# Patient Record
Sex: FEMALE | Race: WHITE | NOT HISPANIC OR LATINO
[De-identification: names, ages, dates, MRNs, and addresses within clinical notes are randomized per-mention and may not be internally consistent; named-entity substitution may affect disease eponyms.]

---

## 2017-01-31 ENCOUNTER — APPOINTMENT (OUTPATIENT)
Dept: INTERNAL MEDICINE | Facility: CLINIC | Age: 82
End: 2017-01-31

## 2017-02-03 ENCOUNTER — APPOINTMENT (OUTPATIENT)
Dept: INTERNAL MEDICINE | Facility: CLINIC | Age: 82
End: 2017-02-03

## 2017-02-03 VITALS
RESPIRATION RATE: 14 BRPM | WEIGHT: 104 LBS | DIASTOLIC BLOOD PRESSURE: 76 MMHG | HEART RATE: 88 BPM | SYSTOLIC BLOOD PRESSURE: 124 MMHG | HEIGHT: 66 IN | BODY MASS INDEX: 16.71 KG/M2

## 2017-02-03 DIAGNOSIS — G45.3 AMAUROSIS FUGAX: ICD-10-CM

## 2017-02-03 DIAGNOSIS — R21 RASH AND OTHER NONSPECIFIC SKIN ERUPTION: ICD-10-CM

## 2017-02-08 ENCOUNTER — LABORATORY RESULT (OUTPATIENT)
Age: 82
End: 2017-02-08

## 2017-02-08 ENCOUNTER — RX RENEWAL (OUTPATIENT)
Age: 82
End: 2017-02-08

## 2017-02-13 LAB
25(OH)D3 SERPL-MCNC: 13.9 NG/ML
ALBUMIN SERPL ELPH-MCNC: 4.3 G/DL
ALP BLD-CCNC: 54 U/L
ALT SERPL-CCNC: 17 U/L
ANION GAP SERPL CALC-SCNC: 15 MMOL/L
APPEARANCE: CLEAR
AST SERPL-CCNC: 23 U/L
BASOPHILS # BLD AUTO: 0.05 K/UL
BASOPHILS NFR BLD AUTO: 0.8 %
BILIRUB SERPL-MCNC: 1.1 MG/DL
BILIRUBIN URINE: NEGATIVE
BLOOD URINE: ABNORMAL
BUN SERPL-MCNC: 13 MG/DL
CALCIUM SERPL-MCNC: 9.7 MG/DL
CHLORIDE SERPL-SCNC: 98 MMOL/L
CHOLEST SERPL-MCNC: 271 MG/DL
CHOLEST/HDLC SERPL: 3.4 RATIO
CO2 SERPL-SCNC: 28 MMOL/L
COLOR: YELLOW
CREAT SERPL-MCNC: 0.83 MG/DL
CRP SERPL HS-MCNC: 0.2 MG/L
EOSINOPHIL # BLD AUTO: 0.07 K/UL
EOSINOPHIL NFR BLD AUTO: 1.1 %
GLUCOSE BS SERPL-MCNC: 87 MG/DL
GLUCOSE QUALITATIVE U: NORMAL MG/DL
GLUCOSE SERPL-MCNC: 91 MG/DL
HBA1C MFR BLD HPLC: 5.9 %
HCT VFR BLD CALC: 42.9 %
HDLC SERPL-MCNC: 79 MG/DL
HGB BLD-MCNC: 13.6 G/DL
IMM GRANULOCYTES NFR BLD AUTO: 0.3 %
KETONES URINE: NEGATIVE
LDLC SERPL CALC-MCNC: 163 MG/DL
LEUKOCYTE ESTERASE URINE: ABNORMAL
LYMPHOCYTES # BLD AUTO: 1.3 K/UL
LYMPHOCYTES NFR BLD AUTO: 21.1 %
MAN DIFF?: NORMAL
MCHC RBC-ENTMCNC: 30.8 PG
MCHC RBC-ENTMCNC: 31.7 GM/DL
MCV RBC AUTO: 97.1 FL
MONOCYTES # BLD AUTO: 0.51 K/UL
MONOCYTES NFR BLD AUTO: 8.3 %
NEUTROPHILS # BLD AUTO: 4.22 K/UL
NEUTROPHILS NFR BLD AUTO: 68.4 %
NITRITE URINE: NEGATIVE
PH URINE: 7.5
PLATELET # BLD AUTO: 232 K/UL
POTASSIUM SERPL-SCNC: 4.6 MMOL/L
PROT SERPL-MCNC: 7.2 G/DL
PROTEIN URINE: NEGATIVE MG/DL
RBC # BLD: 4.42 M/UL
RBC # FLD: 14.7 %
SODIUM SERPL-SCNC: 141 MMOL/L
SPECIFIC GRAVITY URINE: 1.01
T4 FREE SERPL-MCNC: 1.4 NG/DL
TRIGL SERPL-MCNC: 147 MG/DL
TSH SERPL-ACNC: 2.86 UIU/ML
UROBILINOGEN URINE: NORMAL MG/DL
VIT B12 SERPL-MCNC: 320 PG/ML
WBC # FLD AUTO: 6.17 K/UL

## 2017-02-28 ENCOUNTER — APPOINTMENT (OUTPATIENT)
Dept: CARDIOLOGY | Facility: CLINIC | Age: 82
End: 2017-02-28

## 2017-03-03 ENCOUNTER — EMERGENCY (EMERGENCY)
Facility: HOSPITAL | Age: 82
LOS: 0 days | Discharge: HOME | End: 2017-03-03
Attending: EMERGENCY MEDICINE | Admitting: EMERGENCY MEDICINE
Payer: MEDICARE

## 2017-03-03 VITALS
OXYGEN SATURATION: 99 % | SYSTOLIC BLOOD PRESSURE: 160 MMHG | HEART RATE: 82 BPM | TEMPERATURE: 97 F | RESPIRATION RATE: 16 BRPM

## 2017-03-03 VITALS
WEIGHT: 125.22 LBS | HEART RATE: 100 BPM | SYSTOLIC BLOOD PRESSURE: 179 MMHG | HEIGHT: 63 IN | RESPIRATION RATE: 19 BRPM | OXYGEN SATURATION: 99 % | DIASTOLIC BLOOD PRESSURE: 111 MMHG | TEMPERATURE: 98 F

## 2017-03-03 DIAGNOSIS — N39.0 URINARY TRACT INFECTION, SITE NOT SPECIFIED: ICD-10-CM

## 2017-03-03 DIAGNOSIS — R42 DIZZINESS AND GIDDINESS: ICD-10-CM

## 2017-03-03 DIAGNOSIS — Z96.642 PRESENCE OF LEFT ARTIFICIAL HIP JOINT: Chronic | ICD-10-CM

## 2017-03-03 DIAGNOSIS — I48.92 UNSPECIFIED ATRIAL FLUTTER: ICD-10-CM

## 2017-03-03 DIAGNOSIS — Z79.82 LONG TERM (CURRENT) USE OF ASPIRIN: ICD-10-CM

## 2017-03-03 DIAGNOSIS — Z99.3 DEPENDENCE ON WHEELCHAIR: ICD-10-CM

## 2017-03-03 DIAGNOSIS — E03.9 HYPOTHYROIDISM, UNSPECIFIED: ICD-10-CM

## 2017-03-03 DIAGNOSIS — Z98.41 CATARACT EXTRACTION STATUS, RIGHT EYE: Chronic | ICD-10-CM

## 2017-03-03 LAB
ALBUMIN SERPL ELPH-MCNC: 3.8 G/DL — SIGNIFICANT CHANGE UP (ref 3.3–5)
ALP SERPL-CCNC: 56 U/L — SIGNIFICANT CHANGE UP (ref 40–120)
ALT FLD-CCNC: 19 U/L — SIGNIFICANT CHANGE UP (ref 12–78)
ANION GAP SERPL CALC-SCNC: 6 MMOL/L — SIGNIFICANT CHANGE UP (ref 5–17)
APPEARANCE UR: CLEAR — SIGNIFICANT CHANGE UP
AST SERPL-CCNC: 39 U/L — HIGH (ref 15–37)
BACTERIA # UR AUTO: (no result)
BASOPHILS # BLD AUTO: 0.1 K/UL — SIGNIFICANT CHANGE UP (ref 0–0.2)
BASOPHILS NFR BLD AUTO: 1.4 % — SIGNIFICANT CHANGE UP (ref 0–2)
BILIRUB SERPL-MCNC: 1.1 MG/DL — SIGNIFICANT CHANGE UP (ref 0.2–1.2)
BILIRUB UR-MCNC: NEGATIVE — SIGNIFICANT CHANGE UP
BUN SERPL-MCNC: 16 MG/DL — SIGNIFICANT CHANGE UP (ref 7–23)
CALCIUM SERPL-MCNC: 9.1 MG/DL — SIGNIFICANT CHANGE UP (ref 8.5–10.1)
CHLORIDE SERPL-SCNC: 102 MMOL/L — SIGNIFICANT CHANGE UP (ref 96–108)
CO2 SERPL-SCNC: 31 MMOL/L — SIGNIFICANT CHANGE UP (ref 22–31)
COLOR SPEC: YELLOW — SIGNIFICANT CHANGE UP
COMMENT - URINE: SIGNIFICANT CHANGE UP
CREAT SERPL-MCNC: 0.78 MG/DL — SIGNIFICANT CHANGE UP (ref 0.5–1.3)
DIFF PNL FLD: (no result)
EOSINOPHIL # BLD AUTO: 0.1 K/UL — SIGNIFICANT CHANGE UP (ref 0–0.5)
EOSINOPHIL NFR BLD AUTO: 1.5 % — SIGNIFICANT CHANGE UP (ref 0–6)
EPI CELLS # UR: SIGNIFICANT CHANGE UP
GLUCOSE SERPL-MCNC: 85 MG/DL — SIGNIFICANT CHANGE UP (ref 70–99)
GLUCOSE UR QL: NEGATIVE MG/DL — SIGNIFICANT CHANGE UP
HCT VFR BLD CALC: 42 % — SIGNIFICANT CHANGE UP (ref 34.5–45)
HGB BLD-MCNC: 14.3 G/DL — SIGNIFICANT CHANGE UP (ref 11.5–15.5)
KETONES UR-MCNC: NEGATIVE — SIGNIFICANT CHANGE UP
LEUKOCYTE ESTERASE UR-ACNC: (no result)
LYMPHOCYTES # BLD AUTO: 1.4 K/UL — SIGNIFICANT CHANGE UP (ref 1–3.3)
LYMPHOCYTES # BLD AUTO: 22 % — SIGNIFICANT CHANGE UP (ref 13–44)
MCHC RBC-ENTMCNC: 32.5 PG — SIGNIFICANT CHANGE UP (ref 27–34)
MCHC RBC-ENTMCNC: 34.1 GM/DL — SIGNIFICANT CHANGE UP (ref 32–36)
MCV RBC AUTO: 95.4 FL — SIGNIFICANT CHANGE UP (ref 80–100)
MONOCYTES # BLD AUTO: 0.5 K/UL — SIGNIFICANT CHANGE UP (ref 0–0.9)
MONOCYTES NFR BLD AUTO: 8.2 % — SIGNIFICANT CHANGE UP (ref 2–14)
NEUTROPHILS # BLD AUTO: 4.2 K/UL — SIGNIFICANT CHANGE UP (ref 1.8–7.4)
NEUTROPHILS NFR BLD AUTO: 67 % — SIGNIFICANT CHANGE UP (ref 43–77)
NITRITE UR-MCNC: NEGATIVE — SIGNIFICANT CHANGE UP
PH UR: 8 — SIGNIFICANT CHANGE UP (ref 4.8–8)
PLATELET # BLD AUTO: 196 K/UL — SIGNIFICANT CHANGE UP (ref 150–400)
POTASSIUM SERPL-MCNC: 5.1 MMOL/L — SIGNIFICANT CHANGE UP (ref 3.5–5.3)
POTASSIUM SERPL-SCNC: 5.1 MMOL/L — SIGNIFICANT CHANGE UP (ref 3.5–5.3)
PROT SERPL-MCNC: 7.2 GM/DL — SIGNIFICANT CHANGE UP (ref 6–8.3)
PROT UR-MCNC: 15 MG/DL
RBC # BLD: 4.41 M/UL — SIGNIFICANT CHANGE UP (ref 3.8–5.2)
RBC # FLD: 13.2 % — SIGNIFICANT CHANGE UP (ref 10.3–14.5)
RBC CASTS # UR COMP ASSIST: (no result) /HPF (ref 0–4)
SODIUM SERPL-SCNC: 139 MMOL/L — SIGNIFICANT CHANGE UP (ref 135–145)
SP GR SPEC: 1.01 — SIGNIFICANT CHANGE UP (ref 1.01–1.02)
UROBILINOGEN FLD QL: NEGATIVE MG/DL — SIGNIFICANT CHANGE UP
WBC # BLD: 6.3 K/UL — SIGNIFICANT CHANGE UP (ref 3.8–10.5)
WBC # FLD AUTO: 6.3 K/UL — SIGNIFICANT CHANGE UP (ref 3.8–10.5)
WBC UR QL: (no result)

## 2017-03-03 PROCEDURE — 70450 CT HEAD/BRAIN W/O DYE: CPT | Mod: 26

## 2017-03-03 PROCEDURE — 99284 EMERGENCY DEPT VISIT MOD MDM: CPT

## 2017-03-03 PROCEDURE — 93010 ELECTROCARDIOGRAM REPORT: CPT

## 2017-03-03 PROCEDURE — 71010: CPT | Mod: 26

## 2017-03-03 RX ORDER — CEFTRIAXONE 500 MG/1
1 INJECTION, POWDER, FOR SOLUTION INTRAMUSCULAR; INTRAVENOUS ONCE
Qty: 0 | Refills: 0 | Status: COMPLETED | OUTPATIENT
Start: 2017-03-03 | End: 2017-03-03

## 2017-03-03 RX ORDER — CEPHALEXIN 500 MG
1 CAPSULE ORAL
Qty: 40 | Refills: 0 | OUTPATIENT
Start: 2017-03-03 | End: 2017-03-13

## 2017-03-03 RX ORDER — SODIUM CHLORIDE 9 MG/ML
1000 INJECTION INTRAMUSCULAR; INTRAVENOUS; SUBCUTANEOUS ONCE
Qty: 0 | Refills: 0 | Status: COMPLETED | OUTPATIENT
Start: 2017-03-03 | End: 2017-03-03

## 2017-03-03 RX ORDER — POLYETHYLENE GLYCOL 3350 17 G/17G
17 POWDER, FOR SOLUTION ORAL ONCE
Qty: 0 | Refills: 0 | Status: COMPLETED | OUTPATIENT
Start: 2017-03-03 | End: 2017-03-03

## 2017-03-03 RX ADMIN — CEFTRIAXONE 100 GRAM(S): 500 INJECTION, POWDER, FOR SOLUTION INTRAMUSCULAR; INTRAVENOUS at 18:05

## 2017-03-03 RX ADMIN — SODIUM CHLORIDE 1000 MILLILITER(S): 9 INJECTION INTRAMUSCULAR; INTRAVENOUS; SUBCUTANEOUS at 16:09

## 2017-03-03 RX ADMIN — POLYETHYLENE GLYCOL 3350 17 GRAM(S): 17 POWDER, FOR SOLUTION ORAL at 17:25

## 2017-03-03 NOTE — ED PROVIDER NOTE - NEUROLOGICAL, MLM
Alert and oriented, no focal deficits, no motor or sensory deficits. Positive finger to nose. Positive SLR.

## 2017-03-03 NOTE — ED PROVIDER NOTE - DETAILS:
I, Prateek Madrid, performed the initial face to face bedside interview with this patient regarding history of present illness, review of symptoms and relevant past medical, social and family history.  I completed an independent physical examination.  I was the initial provider who evaluated this patient.  The history, relevant review of systems, past medical and surgical history, medical decision making, and physical examination was documented by the scribe in my presence and I attest to the accuracy of the documentation.

## 2017-03-03 NOTE — ED PROVIDER NOTE - PMH
Atrial fibrillation, chronic    Carpal tunnel syndrome, right    Hypertension    Hypothyroid    Other iron deficiency anemia

## 2017-03-03 NOTE — ED PROVIDER NOTE - CONSTITUTIONAL, MLM
normal... Well appearing, elderly female, awake, alert, oriented to person, place, time/situation and in no apparent distress.

## 2017-03-03 NOTE — ED PROVIDER NOTE - NS ED MD SCRIBE ATTENDING SCRIBE SECTIONS
PROGRESS NOTE/RESULTS/PAST MEDICAL/SURGICAL/SOCIAL HISTORY/HISTORY OF PRESENT ILLNESS/DISPOSITION/REVIEW OF SYSTEMS/PHYSICAL EXAM

## 2017-03-03 NOTE — ED PROVIDER NOTE - OBJECTIVE STATEMENT
89 y/o F with a PMHx of AFIB on ASA, HTN, x2 blood transfusions last year presents to the ED c/o vertigo like symptoms including dizziness, spinning sensation, lightheadedness that started this past Sunday. Pt daughter at bedside giving pt hx and states that she has been worsening over the past year. Pt daughter states that she is now wheelchair bound and unable to walk adequately. Pt states that the symptoms are localized to the left side, currently calm and denies any other acute c/o at this time.

## 2017-03-06 ENCOUNTER — APPOINTMENT (OUTPATIENT)
Dept: CARDIOLOGY | Facility: CLINIC | Age: 82
End: 2017-03-06

## 2017-03-06 ENCOUNTER — NON-APPOINTMENT (OUTPATIENT)
Age: 82
End: 2017-03-06

## 2017-03-06 VITALS
BODY MASS INDEX: 16.71 KG/M2 | OXYGEN SATURATION: 97 % | HEIGHT: 66 IN | HEART RATE: 80 BPM | WEIGHT: 104 LBS | RESPIRATION RATE: 17 BRPM | SYSTOLIC BLOOD PRESSURE: 144 MMHG | DIASTOLIC BLOOD PRESSURE: 76 MMHG

## 2017-03-19 ENCOUNTER — RX RENEWAL (OUTPATIENT)
Age: 82
End: 2017-03-19

## 2017-04-20 ENCOUNTER — RX RENEWAL (OUTPATIENT)
Age: 82
End: 2017-04-20

## 2017-05-05 ENCOUNTER — APPOINTMENT (OUTPATIENT)
Dept: INTERNAL MEDICINE | Facility: CLINIC | Age: 82
End: 2017-05-05

## 2017-05-15 ENCOUNTER — APPOINTMENT (OUTPATIENT)
Dept: CARDIOLOGY | Facility: CLINIC | Age: 82
End: 2017-05-15

## 2017-05-16 ENCOUNTER — RX RENEWAL (OUTPATIENT)
Age: 82
End: 2017-05-16

## 2017-07-14 ENCOUNTER — RX RENEWAL (OUTPATIENT)
Age: 82
End: 2017-07-14

## 2017-08-09 ENCOUNTER — RX RENEWAL (OUTPATIENT)
Age: 82
End: 2017-08-09

## 2017-08-15 ENCOUNTER — APPOINTMENT (OUTPATIENT)
Dept: INTERNAL MEDICINE | Facility: CLINIC | Age: 82
End: 2017-08-15
Payer: MEDICARE

## 2017-08-15 VITALS
SYSTOLIC BLOOD PRESSURE: 142 MMHG | BODY MASS INDEX: 17.04 KG/M2 | HEART RATE: 74 BPM | HEIGHT: 66 IN | WEIGHT: 106 LBS | DIASTOLIC BLOOD PRESSURE: 80 MMHG | RESPIRATION RATE: 15 BRPM

## 2017-08-15 DIAGNOSIS — R60.9 EDEMA, UNSPECIFIED: ICD-10-CM

## 2017-08-15 PROCEDURE — 99215 OFFICE O/P EST HI 40 MIN: CPT

## 2017-09-08 ENCOUNTER — RX RENEWAL (OUTPATIENT)
Age: 82
End: 2017-09-08

## 2017-10-16 ENCOUNTER — RX RENEWAL (OUTPATIENT)
Age: 82
End: 2017-10-16

## 2017-11-01 ENCOUNTER — RX RENEWAL (OUTPATIENT)
Age: 82
End: 2017-11-01

## 2017-11-29 ENCOUNTER — APPOINTMENT (OUTPATIENT)
Dept: CARDIOLOGY | Facility: CLINIC | Age: 82
End: 2017-11-29
Payer: MEDICARE

## 2017-11-29 ENCOUNTER — NON-APPOINTMENT (OUTPATIENT)
Age: 82
End: 2017-11-29

## 2017-11-29 VITALS
BODY MASS INDEX: 16.39 KG/M2 | OXYGEN SATURATION: 91 % | HEIGHT: 66 IN | RESPIRATION RATE: 17 BRPM | DIASTOLIC BLOOD PRESSURE: 66 MMHG | WEIGHT: 102 LBS | HEART RATE: 85 BPM | SYSTOLIC BLOOD PRESSURE: 136 MMHG

## 2017-11-29 VITALS — SYSTOLIC BLOOD PRESSURE: 150 MMHG | DIASTOLIC BLOOD PRESSURE: 70 MMHG

## 2017-11-29 PROCEDURE — 93000 ELECTROCARDIOGRAM COMPLETE: CPT

## 2017-11-29 PROCEDURE — 99214 OFFICE O/P EST MOD 30 MIN: CPT

## 2018-01-11 ENCOUNTER — RX RENEWAL (OUTPATIENT)
Age: 83
End: 2018-01-11

## 2018-02-02 ENCOUNTER — MEDICATION RENEWAL (OUTPATIENT)
Age: 83
End: 2018-02-02

## 2018-04-08 ENCOUNTER — EMERGENCY (EMERGENCY)
Facility: HOSPITAL | Age: 83
LOS: 1 days | Discharge: HOME | End: 2018-04-08
Admitting: EMERGENCY MEDICINE
Payer: MEDICARE

## 2018-04-08 DIAGNOSIS — Z96.642 PRESENCE OF LEFT ARTIFICIAL HIP JOINT: Chronic | ICD-10-CM

## 2018-04-08 DIAGNOSIS — E78.00 PURE HYPERCHOLESTEROLEMIA, UNSPECIFIED: ICD-10-CM

## 2018-04-08 DIAGNOSIS — M25.512 PAIN IN LEFT SHOULDER: ICD-10-CM

## 2018-04-08 DIAGNOSIS — Z98.41 CATARACT EXTRACTION STATUS, RIGHT EYE: Chronic | ICD-10-CM

## 2018-04-08 DIAGNOSIS — I10 ESSENTIAL (PRIMARY) HYPERTENSION: ICD-10-CM

## 2018-04-08 DIAGNOSIS — Z87.39 PERSONAL HISTORY OF OTHER DISEASES OF THE MUSCULOSKELETAL SYSTEM AND CONNECTIVE TISSUE: ICD-10-CM

## 2018-04-08 DIAGNOSIS — E03.9 HYPOTHYROIDISM, UNSPECIFIED: ICD-10-CM

## 2018-04-08 DIAGNOSIS — M79.622 PAIN IN LEFT UPPER ARM: ICD-10-CM

## 2018-04-08 DIAGNOSIS — Z79.82 LONG TERM (CURRENT) USE OF ASPIRIN: ICD-10-CM

## 2018-04-08 DIAGNOSIS — Z79.899 OTHER LONG TERM (CURRENT) DRUG THERAPY: ICD-10-CM

## 2018-04-08 PROCEDURE — 99284 EMERGENCY DEPT VISIT MOD MDM: CPT

## 2018-04-08 PROCEDURE — 71101 X-RAY EXAM UNILAT RIBS/CHEST: CPT | Mod: 26

## 2018-04-08 PROCEDURE — 73030 X-RAY EXAM OF SHOULDER: CPT | Mod: 26,LT

## 2018-04-08 PROCEDURE — 73060 X-RAY EXAM OF HUMERUS: CPT | Mod: 26,LT

## 2018-04-09 PROCEDURE — 96372 THER/PROPH/DIAG INJ SC/IM: CPT

## 2018-04-09 PROCEDURE — 99284 EMERGENCY DEPT VISIT MOD MDM: CPT | Mod: 25

## 2018-04-09 PROCEDURE — 73060 X-RAY EXAM OF HUMERUS: CPT

## 2018-04-09 PROCEDURE — 71101 X-RAY EXAM UNILAT RIBS/CHEST: CPT

## 2018-04-09 PROCEDURE — 73030 X-RAY EXAM OF SHOULDER: CPT

## 2018-04-12 ENCOUNTER — APPOINTMENT (OUTPATIENT)
Dept: CT IMAGING | Facility: CLINIC | Age: 83
End: 2018-04-12

## 2018-04-12 ENCOUNTER — OUTPATIENT (OUTPATIENT)
Dept: OUTPATIENT SERVICES | Facility: HOSPITAL | Age: 83
LOS: 1 days | End: 2018-04-12
Payer: MEDICARE

## 2018-04-12 DIAGNOSIS — Z98.41 CATARACT EXTRACTION STATUS, RIGHT EYE: Chronic | ICD-10-CM

## 2018-04-12 DIAGNOSIS — Z96.642 PRESENCE OF LEFT ARTIFICIAL HIP JOINT: Chronic | ICD-10-CM

## 2018-04-12 DIAGNOSIS — Z00.8 ENCOUNTER FOR OTHER GENERAL EXAMINATION: ICD-10-CM

## 2018-04-12 PROCEDURE — 71250 CT THORAX DX C-: CPT

## 2018-04-12 PROCEDURE — 71250 CT THORAX DX C-: CPT | Mod: 26

## 2018-04-15 ENCOUNTER — RX RENEWAL (OUTPATIENT)
Age: 83
End: 2018-04-15

## 2018-04-23 ENCOUNTER — NON-APPOINTMENT (OUTPATIENT)
Age: 83
End: 2018-04-23

## 2018-04-23 ENCOUNTER — APPOINTMENT (OUTPATIENT)
Dept: CARDIOLOGY | Facility: CLINIC | Age: 83
End: 2018-04-23
Payer: MEDICARE

## 2018-04-23 VITALS
SYSTOLIC BLOOD PRESSURE: 146 MMHG | RESPIRATION RATE: 16 BRPM | DIASTOLIC BLOOD PRESSURE: 56 MMHG | HEIGHT: 66 IN | OXYGEN SATURATION: 96 % | HEART RATE: 74 BPM

## 2018-04-23 PROCEDURE — 99215 OFFICE O/P EST HI 40 MIN: CPT

## 2018-04-23 PROCEDURE — 93000 ELECTROCARDIOGRAM COMPLETE: CPT

## 2018-05-28 ENCOUNTER — EMERGENCY (EMERGENCY)
Facility: HOSPITAL | Age: 83
LOS: 1 days | Discharge: HOME | End: 2018-05-28
Admitting: EMERGENCY MEDICINE
Payer: MEDICARE

## 2018-05-28 DIAGNOSIS — Z96.642 PRESENCE OF LEFT ARTIFICIAL HIP JOINT: Chronic | ICD-10-CM

## 2018-05-28 DIAGNOSIS — Z98.41 CATARACT EXTRACTION STATUS, RIGHT EYE: Chronic | ICD-10-CM

## 2018-05-28 PROCEDURE — 87086 URINE CULTURE/COLONY COUNT: CPT

## 2018-05-28 PROCEDURE — 99284 EMERGENCY DEPT VISIT MOD MDM: CPT

## 2018-05-28 PROCEDURE — 70450 CT HEAD/BRAIN W/O DYE: CPT | Mod: 26

## 2018-05-28 PROCEDURE — 99284 EMERGENCY DEPT VISIT MOD MDM: CPT | Mod: 25

## 2018-05-28 PROCEDURE — 81002 URINALYSIS NONAUTO W/O SCOPE: CPT

## 2018-05-28 PROCEDURE — 36415 COLL VENOUS BLD VENIPUNCTURE: CPT

## 2018-05-28 PROCEDURE — 70450 CT HEAD/BRAIN W/O DYE: CPT

## 2018-05-28 PROCEDURE — 80048 BASIC METABOLIC PNL TOTAL CA: CPT

## 2018-05-28 PROCEDURE — 85027 COMPLETE CBC AUTOMATED: CPT

## 2018-06-06 ENCOUNTER — LABORATORY RESULT (OUTPATIENT)
Age: 83
End: 2018-06-06

## 2018-06-06 ENCOUNTER — APPOINTMENT (OUTPATIENT)
Dept: CARDIOLOGY | Facility: CLINIC | Age: 83
End: 2018-06-06

## 2018-06-06 ENCOUNTER — APPOINTMENT (OUTPATIENT)
Dept: CARDIOLOGY | Facility: CLINIC | Age: 83
End: 2018-06-06
Payer: MEDICARE

## 2018-06-06 ENCOUNTER — APPOINTMENT (OUTPATIENT)
Dept: INTERNAL MEDICINE | Facility: CLINIC | Age: 83
End: 2018-06-06
Payer: MEDICARE

## 2018-06-06 ENCOUNTER — NON-APPOINTMENT (OUTPATIENT)
Age: 83
End: 2018-06-06

## 2018-06-06 VITALS
SYSTOLIC BLOOD PRESSURE: 142 MMHG | RESPIRATION RATE: 15 BRPM | HEART RATE: 88 BPM | OXYGEN SATURATION: 95 % | HEIGHT: 66 IN | DIASTOLIC BLOOD PRESSURE: 70 MMHG

## 2018-06-06 VITALS
BODY MASS INDEX: 15.91 KG/M2 | RESPIRATION RATE: 15 BRPM | WEIGHT: 99 LBS | DIASTOLIC BLOOD PRESSURE: 78 MMHG | HEART RATE: 88 BPM | SYSTOLIC BLOOD PRESSURE: 132 MMHG | HEIGHT: 66 IN

## 2018-06-06 DIAGNOSIS — R25.1 TREMOR, UNSPECIFIED: ICD-10-CM

## 2018-06-06 DIAGNOSIS — R27.0 ATAXIA, UNSPECIFIED: ICD-10-CM

## 2018-06-06 DIAGNOSIS — I34.0 NONRHEUMATIC MITRAL (VALVE) INSUFFICIENCY: ICD-10-CM

## 2018-06-06 DIAGNOSIS — I35.1 NONRHEUMATIC AORTIC (VALVE) INSUFFICIENCY: ICD-10-CM

## 2018-06-06 PROCEDURE — 93306 TTE W/DOPPLER COMPLETE: CPT

## 2018-06-06 PROCEDURE — 90670 PCV13 VACCINE IM: CPT

## 2018-06-06 PROCEDURE — 99214 OFFICE O/P EST MOD 30 MIN: CPT

## 2018-06-06 PROCEDURE — 99215 OFFICE O/P EST HI 40 MIN: CPT | Mod: 25

## 2018-06-06 PROCEDURE — G0009: CPT

## 2018-06-06 PROCEDURE — 93000 ELECTROCARDIOGRAM COMPLETE: CPT

## 2018-06-06 NOTE — REVIEW OF SYSTEMS
[Fatigue] : fatigue [Hearing Loss] : hearing loss [Nasal Discharge] : nasal discharge [Postnasal Drip] : postnasal drip [Constipation] : constipation [Nocturia] : nocturia [Poor Libido] : poor libido [Joint Stiffness] : joint stiffness [Muscle Weakness] : muscle weakness [Back Pain] : back pain [Insomnia] : insomnia [Anxiety] : anxiety [Depression] : depression [Negative] : Heme/Lymph [Fever] : no fever [Chills] : no chills [Hot Flashes] : no hot flashes [Night Sweats] : no night sweats [Recent Change In Weight] : ~T no recent weight change [Discharge] : no discharge [Pain] : no pain [Redness] : no redness [Dryness] : no dryness  [Vision Problems] : no vision problems [Itching] : no itching [Earache] : no earache [Hoarseness] : no hoarseness [Sore Throat] : no sore throat [Chest Pain] : no chest pain [Palpitations] : no palpitations [Leg Claudication] : no leg claudication [Lower Ext Edema] : no lower extremity edema [Orthopnea] : no orthopnea [Paroysmal Nocturnal Dyspnea] : no paroysmal nocturnal dyspnea [Shortness Of Breath] : no shortness of breath [Wheezing] : no wheezing [Cough] : no cough [Dyspnea on Exertion] : no dyspnea on exertion [Abdominal Pain] : no abdominal pain [Nausea] : no nausea [Diarrhea] : diarrhea [Vomiting] : no vomiting [Heartburn] : no heartburn [Melena] : no melena [Dysuria] : no dysuria [Incontinence] : no incontinence [Hematuria] : no hematuria [Frequency] : no frequency [Vaginal Discharge] : no vaginal discharge [Dysmenorrhea] : no dysmenorrhea [Joint Pain] : no joint pain [Joint Swelling] : no joint swelling [Muscle Pain] : no muscle pain [Itching] : no itching [Mole Changes] : no mole changes [Nail Changes] : no nail changes [Hair Changes] : no hair changes [Skin Rash] : no skin rash [Headache] : no headache [Dizziness] : no dizziness [Fainting] : no fainting [Confusion] : no confusion [Memory Loss] : no memory loss [Unsteady Walking] : no ataxia [Suicidal] : not suicidal [Easy Bleeding] : no easy bleeding [Easy Bruising] : no easy bruising [Swollen Glands] : no swollen glands

## 2018-06-06 NOTE — HISTORY OF PRESENT ILLNESS
[FreeTextEntry1] : Comes to the office for followup accompanied by her daughter and aide to review her medications and discuss her overall health she is mostly concerned about postnasal drip that she has been having [de-identified] : 89-year-old woman comes to the office by wheelchair for followup with a history of ataxia constipation hyperlipidemia hypertension hypothyroidism mitral regurgitation anemia and atrial fibrillation he is in his wheelchair bound second to an unsteady gait and muscle weakness she has been eating poorly and she has lost several pounds since last seen she denies abdominal pain nausea or vomiting she has no diarrhea rectal bleeding she is constipated denies chest pain shortness of breath wheezing coughing palpitations lightheadedness vertigo dizziness or syncope he has had no temperature chills sweats or myalgias is no dysuria she does get up 3 or 4 times at night to urinate

## 2018-06-06 NOTE — PHYSICAL EXAM
[No Acute Distress] : no acute distress [Well Nourished] : well nourished [Well Developed] : well developed [Well-Appearing] : well-appearing [Normal Voice/Communication] : normal voice/communication [Normal Sclera/Conjunctiva] : normal sclera/conjunctiva [PERRL] : pupils equal round and reactive to light [EOMI] : extraocular movements intact [Normal Outer Ear/Nose] : the outer ears and nose were normal in appearance [Normal Oropharynx] : the oropharynx was normal [Normal TMs] : both tympanic membranes were normal [Normal Nasal Mucosa] : the nasal mucosa was normal [No JVD] : no jugular venous distention [Supple] : supple [No Lymphadenopathy] : no lymphadenopathy [Thyroid Normal, No Nodules] : the thyroid was normal and there were no nodules present [No Respiratory Distress] : no respiratory distress  [Clear to Auscultation] : lungs were clear to auscultation bilaterally [No Accessory Muscle Use] : no accessory muscle use [Normal Percussion] : the chest was normal to percussion [Normal Rate] : normal rate  [Regular Rhythm] : with a regular rhythm [Normal S1, S2] : normal S1 and S2 [No Murmur] : no murmur heard [No Carotid Bruits] : no carotid bruits [No Abdominal Bruit] : a ~M bruit was not heard ~T in the abdomen [No Varicosities] : no varicosities [Pedal Pulses Present] : the pedal pulses are present [No Edema] : there was no peripheral edema [No Extremity Clubbing/Cyanosis] : no extremity clubbing/cyanosis [No Palpable Aorta] : no palpable aorta [Declined Breast Exam] : declined breast exam  [Soft] : abdomen soft [Non Tender] : non-tender [Non-distended] : non-distended [No Masses] : no abdominal mass palpated [No HSM] : no HSM [Normal Bowel Sounds] : normal bowel sounds [No Hernias] : no hernias [Normal Supraclavicular Nodes] : no supraclavicular lymphadenopathy [Normal Axillary Nodes] : no axillary lymphadenopathy [Normal Posterior Cervical Nodes] : no posterior cervical lymphadenopathy [Normal Femoral Nodes] : no femoral lymphadenopathy [Normal Anterior Cervical Nodes] : no anterior cervical lymphadenopathy [Normal Inguinal Nodes] : no inguinal lymphadenopathy [No CVA Tenderness] : no CVA  tenderness [No Spinal Tenderness] : no spinal tenderness [No Joint Swelling] : no joint swelling [Grossly Normal Strength/Tone] : grossly normal strength/tone [No Rash] : no rash [Normal Gait] : normal gait [Coordination Grossly Intact] : coordination grossly intact [No Focal Deficits] : no focal deficits [Deep Tendon Reflexes (DTR)] : deep tendon reflexes were 2+ and symmetric [Speech Grossly Normal] : speech grossly normal [Memory Grossly Normal] : memory grossly normal [Normal Affect] : the affect was normal [Alert and Oriented x3] : oriented to person, place, and time [Normal Mood] : the mood was normal [Normal Insight/Judgement] : insight and judgment were intact [Kyphosis] : no kyphosis [Scoliosis] : no scoliosis

## 2018-06-06 NOTE — ASSESSMENT
[FreeTextEntry1] : Physical exam shows an elderly woman in no acute distress blood pressure was 132/78 her height was 5 foot 6 weight approximately 99 pounds pulse is 88.. Respiratory rate was 15 HEENT was unremarkable chest was clear cardiovascular exam was irregular with a 2/6 systolic murmur abdomen soft and nontender extremities show no clubbing cyanosis or edema neurologic exam was nonfocal........... tremor neurologic origin referred to Dr. James neurologist for opinion as per her bowels she was told to increase the fiber in her diet and liquids he often frequently rather than having to worry about he does a lot of meals she was advised to use MiraLax and/or fiber in the morning every day and she is milk of magnesia or a Fleet enema if she is down to limit her antibiotics as unsure if clear urinary tract infection secondary to no urine analysis did show leukocytes. We'll repeat a urine culture and urinalysis next week

## 2018-06-07 ENCOUNTER — LABORATORY RESULT (OUTPATIENT)
Age: 83
End: 2018-06-07

## 2018-06-19 DIAGNOSIS — Z00.00 ENCOUNTER FOR GENERAL ADULT MEDICAL EXAMINATION W/OUT ABNORMAL FINDINGS: ICD-10-CM

## 2018-06-19 LAB
APPEARANCE: CLEAR
BILIRUBIN URINE: NEGATIVE
BLOOD URINE: NEGATIVE
COLOR: YELLOW
GLUCOSE QUALITATIVE U: NEGATIVE MG/DL
KETONES URINE: NEGATIVE
LEUKOCYTE ESTERASE URINE: ABNORMAL
NITRITE URINE: NEGATIVE
PH URINE: 7.5
PROTEIN URINE: ABNORMAL MG/DL
SPECIFIC GRAVITY URINE: 1.02
UROBILINOGEN URINE: NEGATIVE MG/DL

## 2018-06-20 ENCOUNTER — EMERGENCY (EMERGENCY)
Facility: HOSPITAL | Age: 83
LOS: 0 days | Discharge: HOME | End: 2018-06-20
Attending: EMERGENCY MEDICINE | Admitting: EMERGENCY MEDICINE
Payer: MEDICARE

## 2018-06-20 VITALS
SYSTOLIC BLOOD PRESSURE: 156 MMHG | DIASTOLIC BLOOD PRESSURE: 99 MMHG | RESPIRATION RATE: 18 BRPM | HEART RATE: 106 BPM | OXYGEN SATURATION: 100 %

## 2018-06-20 VITALS
OXYGEN SATURATION: 100 % | HEART RATE: 71 BPM | SYSTOLIC BLOOD PRESSURE: 158 MMHG | TEMPERATURE: 98 F | RESPIRATION RATE: 16 BRPM | DIASTOLIC BLOOD PRESSURE: 79 MMHG

## 2018-06-20 DIAGNOSIS — R10.13 EPIGASTRIC PAIN: ICD-10-CM

## 2018-06-20 DIAGNOSIS — E03.9 HYPOTHYROIDISM, UNSPECIFIED: ICD-10-CM

## 2018-06-20 DIAGNOSIS — Z79.899 OTHER LONG TERM (CURRENT) DRUG THERAPY: ICD-10-CM

## 2018-06-20 DIAGNOSIS — Z96.642 PRESENCE OF LEFT ARTIFICIAL HIP JOINT: Chronic | ICD-10-CM

## 2018-06-20 DIAGNOSIS — I48.91 UNSPECIFIED ATRIAL FIBRILLATION: ICD-10-CM

## 2018-06-20 DIAGNOSIS — D64.9 ANEMIA, UNSPECIFIED: ICD-10-CM

## 2018-06-20 DIAGNOSIS — Z98.41 CATARACT EXTRACTION STATUS, RIGHT EYE: Chronic | ICD-10-CM

## 2018-06-20 DIAGNOSIS — Z79.82 LONG TERM (CURRENT) USE OF ASPIRIN: ICD-10-CM

## 2018-06-20 DIAGNOSIS — I10 ESSENTIAL (PRIMARY) HYPERTENSION: ICD-10-CM

## 2018-06-20 LAB
ALBUMIN SERPL ELPH-MCNC: 3.2 G/DL — LOW (ref 3.3–5)
ALP SERPL-CCNC: 66 U/L — SIGNIFICANT CHANGE UP (ref 40–120)
ALT FLD-CCNC: 16 U/L — SIGNIFICANT CHANGE UP (ref 12–78)
ANION GAP SERPL CALC-SCNC: 5 MMOL/L — SIGNIFICANT CHANGE UP (ref 5–17)
APPEARANCE UR: CLEAR — SIGNIFICANT CHANGE UP
APTT BLD: 26.4 SEC — LOW (ref 27.5–37.4)
AST SERPL-CCNC: 27 U/L — SIGNIFICANT CHANGE UP (ref 15–37)
BACTERIA # UR AUTO: ABNORMAL
BASOPHILS # BLD AUTO: 0.05 K/UL — SIGNIFICANT CHANGE UP (ref 0–0.2)
BASOPHILS NFR BLD AUTO: 1 % — SIGNIFICANT CHANGE UP (ref 0–2)
BILIRUB SERPL-MCNC: 0.6 MG/DL — SIGNIFICANT CHANGE UP (ref 0.2–1.2)
BILIRUB UR-MCNC: NEGATIVE — SIGNIFICANT CHANGE UP
BUN SERPL-MCNC: 22 MG/DL — SIGNIFICANT CHANGE UP (ref 7–23)
CALCIUM SERPL-MCNC: 9.2 MG/DL — SIGNIFICANT CHANGE UP (ref 8.5–10.1)
CHLORIDE SERPL-SCNC: 101 MMOL/L — SIGNIFICANT CHANGE UP (ref 96–108)
CK SERPL-CCNC: 73 U/L — SIGNIFICANT CHANGE UP (ref 26–192)
CO2 SERPL-SCNC: 33 MMOL/L — HIGH (ref 22–31)
COLOR SPEC: YELLOW — SIGNIFICANT CHANGE UP
CREAT SERPL-MCNC: 0.96 MG/DL — SIGNIFICANT CHANGE UP (ref 0.5–1.3)
DIFF PNL FLD: ABNORMAL
EOSINOPHIL # BLD AUTO: 0.15 K/UL — SIGNIFICANT CHANGE UP (ref 0–0.5)
EOSINOPHIL NFR BLD AUTO: 2.9 % — SIGNIFICANT CHANGE UP (ref 0–6)
EPI CELLS # UR: ABNORMAL
GLUCOSE SERPL-MCNC: 107 MG/DL — HIGH (ref 70–99)
GLUCOSE UR QL: NEGATIVE MG/DL — SIGNIFICANT CHANGE UP
HCT VFR BLD CALC: 38.4 % — SIGNIFICANT CHANGE UP (ref 34.5–45)
HGB BLD-MCNC: 12.5 G/DL — SIGNIFICANT CHANGE UP (ref 11.5–15.5)
IMM GRANULOCYTES NFR BLD AUTO: 0.4 % — SIGNIFICANT CHANGE UP (ref 0–1.5)
INR BLD: 0.95 RATIO — SIGNIFICANT CHANGE UP (ref 0.88–1.16)
KETONES UR-MCNC: NEGATIVE — SIGNIFICANT CHANGE UP
LEUKOCYTE ESTERASE UR-ACNC: ABNORMAL
LYMPHOCYTES # BLD AUTO: 1.41 K/UL — SIGNIFICANT CHANGE UP (ref 1–3.3)
LYMPHOCYTES # BLD AUTO: 27.6 % — SIGNIFICANT CHANGE UP (ref 13–44)
MCHC RBC-ENTMCNC: 29.6 PG — SIGNIFICANT CHANGE UP (ref 27–34)
MCHC RBC-ENTMCNC: 32.6 GM/DL — SIGNIFICANT CHANGE UP (ref 32–36)
MCV RBC AUTO: 90.8 FL — SIGNIFICANT CHANGE UP (ref 80–100)
MONOCYTES # BLD AUTO: 0.51 K/UL — SIGNIFICANT CHANGE UP (ref 0–0.9)
MONOCYTES NFR BLD AUTO: 10 % — SIGNIFICANT CHANGE UP (ref 2–14)
NEUTROPHILS # BLD AUTO: 2.96 K/UL — SIGNIFICANT CHANGE UP (ref 1.8–7.4)
NEUTROPHILS NFR BLD AUTO: 58.1 % — SIGNIFICANT CHANGE UP (ref 43–77)
NITRITE UR-MCNC: NEGATIVE — SIGNIFICANT CHANGE UP
NRBC # BLD: 0 /100 WBCS — SIGNIFICANT CHANGE UP (ref 0–0)
PH UR: 6 — SIGNIFICANT CHANGE UP (ref 5–8)
PLATELET # BLD AUTO: 225 K/UL — SIGNIFICANT CHANGE UP (ref 150–400)
POTASSIUM SERPL-MCNC: 4.8 MMOL/L — SIGNIFICANT CHANGE UP (ref 3.5–5.3)
POTASSIUM SERPL-SCNC: 4.8 MMOL/L — SIGNIFICANT CHANGE UP (ref 3.5–5.3)
PROT SERPL-MCNC: 6.9 GM/DL — SIGNIFICANT CHANGE UP (ref 6–8.3)
PROT UR-MCNC: 30 MG/DL
PROTHROM AB SERPL-ACNC: 10.2 SEC — SIGNIFICANT CHANGE UP (ref 9.8–12.7)
RBC # BLD: 4.23 M/UL — SIGNIFICANT CHANGE UP (ref 3.8–5.2)
RBC # FLD: 14.6 % — HIGH (ref 10.3–14.5)
RBC CASTS # UR COMP ASSIST: SIGNIFICANT CHANGE UP /HPF (ref 0–4)
SODIUM SERPL-SCNC: 139 MMOL/L — SIGNIFICANT CHANGE UP (ref 135–145)
SP GR SPEC: 1.02 — SIGNIFICANT CHANGE UP (ref 1.01–1.02)
TROPONIN I SERPL-MCNC: <0.015 NG/ML — SIGNIFICANT CHANGE UP (ref 0.01–0.04)
TROPONIN I SERPL-MCNC: <0.015 NG/ML — SIGNIFICANT CHANGE UP (ref 0.01–0.04)
UROBILINOGEN FLD QL: NEGATIVE MG/DL — SIGNIFICANT CHANGE UP
WBC # BLD: 5.1 K/UL — SIGNIFICANT CHANGE UP (ref 3.8–10.5)
WBC # FLD AUTO: 5.1 K/UL — SIGNIFICANT CHANGE UP (ref 3.8–10.5)
WBC UR QL: ABNORMAL

## 2018-06-20 PROCEDURE — 99284 EMERGENCY DEPT VISIT MOD MDM: CPT | Mod: 25

## 2018-06-20 PROCEDURE — 71045 X-RAY EXAM CHEST 1 VIEW: CPT | Mod: 26

## 2018-06-20 PROCEDURE — 93010 ELECTROCARDIOGRAM REPORT: CPT

## 2018-06-20 RX ORDER — SODIUM CHLORIDE 9 MG/ML
3 INJECTION INTRAMUSCULAR; INTRAVENOUS; SUBCUTANEOUS ONCE
Qty: 0 | Refills: 0 | Status: COMPLETED | OUTPATIENT
Start: 2018-06-20 | End: 2018-06-20

## 2018-06-20 RX ORDER — CEPHALEXIN 500 MG
1 CAPSULE ORAL
Qty: 21 | Refills: 0 | OUTPATIENT
Start: 2018-06-20 | End: 2018-06-26

## 2018-06-20 RX ORDER — CEFTRIAXONE 500 MG/1
1000 INJECTION, POWDER, FOR SOLUTION INTRAMUSCULAR; INTRAVENOUS ONCE
Qty: 0 | Refills: 0 | Status: COMPLETED | OUTPATIENT
Start: 2018-06-20 | End: 2018-06-20

## 2018-06-20 RX ORDER — CEFTRIAXONE 500 MG/1
1 INJECTION, POWDER, FOR SOLUTION INTRAMUSCULAR; INTRAVENOUS ONCE
Qty: 0 | Refills: 0 | Status: DISCONTINUED | OUTPATIENT
Start: 2018-06-20 | End: 2018-06-20

## 2018-06-20 RX ORDER — HYDRALAZINE HCL 50 MG
10 TABLET ORAL ONCE
Qty: 0 | Refills: 0 | Status: COMPLETED | OUTPATIENT
Start: 2018-06-20 | End: 2018-06-20

## 2018-06-20 RX ADMIN — SODIUM CHLORIDE 3 MILLILITER(S): 9 INJECTION INTRAMUSCULAR; INTRAVENOUS; SUBCUTANEOUS at 02:47

## 2018-06-20 RX ADMIN — Medication 10 MILLIGRAM(S): at 03:59

## 2018-06-20 RX ADMIN — CEFTRIAXONE 1000 MILLIGRAM(S): 500 INJECTION, POWDER, FOR SOLUTION INTRAMUSCULAR; INTRAVENOUS at 06:32

## 2018-06-20 NOTE — ED PROVIDER NOTE - OBJECTIVE STATEMENT
89 yo female brought in by daughter for c/o epigastric burning tonight. pt awake, alert, denies chest pain. has ho afib on aspirin due to falls as per hisotry provided by daughter at bedside

## 2018-06-20 NOTE — ED ADULT NURSE NOTE - OBJECTIVE STATEMENT
pt BIBA accompanied by daughter, with whom she lives. pt had been c/o lower chest/abdominal pains, was not sure if it was a gas pain. per daughter pt was "not her normal self" briefly at home while c/o pain and decided to call the ambulance. pt denies any recent traumas or falls. pt has history of falls and broken bones to Left hip and wrist in the past. pt denies changes in bowel habits. denies n/v/d.

## 2018-06-25 ENCOUNTER — LABORATORY RESULT (OUTPATIENT)
Age: 83
End: 2018-06-25

## 2018-06-27 ENCOUNTER — LABORATORY RESULT (OUTPATIENT)
Age: 83
End: 2018-06-27

## 2018-07-03 DIAGNOSIS — R19.7 DIARRHEA, UNSPECIFIED: ICD-10-CM

## 2018-07-03 LAB
APPEARANCE: CLEAR
BILIRUBIN URINE: NEGATIVE
BLOOD URINE: ABNORMAL
COLOR: YELLOW
GLUCOSE QUALITATIVE U: NEGATIVE MG/DL
KETONES URINE: NEGATIVE
LEUKOCYTE ESTERASE URINE: ABNORMAL
NITRITE URINE: NEGATIVE
PH URINE: 7
PROTEIN URINE: ABNORMAL MG/DL
SPECIFIC GRAVITY URINE: 1.02
UROBILINOGEN URINE: NEGATIVE MG/DL

## 2018-07-09 PROBLEM — R19.7 DIARRHEA: Status: ACTIVE | Noted: 2018-07-09

## 2018-07-18 ENCOUNTER — RX RENEWAL (OUTPATIENT)
Age: 83
End: 2018-07-18

## 2018-07-24 LAB
C DIFF TOX GENS STL QL NAA+PROBE: NORMAL
CDIFF BY PCR: NOT DETECTED

## 2018-08-01 LAB
25(OH)D3 SERPL-MCNC: 23.7 NG/ML
ALBUMIN SERPL ELPH-MCNC: 4.5 G/DL
ALP BLD-CCNC: 63 U/L
ALT SERPL-CCNC: 15 U/L
ANION GAP SERPL CALC-SCNC: 16 MMOL/L
AST SERPL-CCNC: 23 U/L
BASOPHILS # BLD AUTO: 0.03 K/UL
BASOPHILS NFR BLD AUTO: 0.5 %
BILIRUB SERPL-MCNC: 1.4 MG/DL
BUN SERPL-MCNC: 15 MG/DL
CALCIUM SERPL-MCNC: 9.5 MG/DL
CHLORIDE SERPL-SCNC: 96 MMOL/L
CHOLEST SERPL-MCNC: 245 MG/DL
CHOLEST/HDLC SERPL: 3 RATIO
CO2 SERPL-SCNC: 28 MMOL/L
CREAT SERPL-MCNC: 0.77 MG/DL
CRP SERPL HS-MCNC: 0.2 MG/L
EOSINOPHIL # BLD AUTO: 0.06 K/UL
EOSINOPHIL NFR BLD AUTO: 1 %
GLUCOSE BS SERPL-MCNC: 69 MG/DL
GLUCOSE SERPL-MCNC: 73 MG/DL
HBA1C MFR BLD HPLC: 5.7 %
HCT VFR BLD CALC: 42.1 %
HDLC SERPL-MCNC: 82 MG/DL
HGB BLD-MCNC: 13 G/DL
IMM GRANULOCYTES NFR BLD AUTO: 0.3 %
LDLC SERPL CALC-MCNC: 144 MG/DL
LYMPHOCYTES # BLD AUTO: 1.21 K/UL
LYMPHOCYTES NFR BLD AUTO: 20.9 %
MAN DIFF?: NORMAL
MCHC RBC-ENTMCNC: 29 PG
MCHC RBC-ENTMCNC: 30.9 GM/DL
MCV RBC AUTO: 93.8 FL
MONOCYTES # BLD AUTO: 0.47 K/UL
MONOCYTES NFR BLD AUTO: 8.1 %
NEUTROPHILS # BLD AUTO: 4.01 K/UL
NEUTROPHILS NFR BLD AUTO: 69.2 %
PLATELET # BLD AUTO: 209 K/UL
POTASSIUM SERPL-SCNC: 4.5 MMOL/L
PROT SERPL-MCNC: 6.9 G/DL
RBC # BLD: 4.49 M/UL
RBC # FLD: 16.1 %
SODIUM SERPL-SCNC: 140 MMOL/L
T4 FREE SERPL-MCNC: 1.6 NG/DL
TRIGL SERPL-MCNC: 97 MG/DL
TSH SERPL-ACNC: 2.69 UIU/ML
VIT B12 SERPL-MCNC: 322 PG/ML
WBC # FLD AUTO: 5.8 K/UL

## 2018-08-27 ENCOUNTER — APPOINTMENT (OUTPATIENT)
Dept: CARDIOLOGY | Facility: CLINIC | Age: 83
End: 2018-08-27

## 2018-10-10 ENCOUNTER — RX RENEWAL (OUTPATIENT)
Age: 83
End: 2018-10-10

## 2019-01-09 ENCOUNTER — RX RENEWAL (OUTPATIENT)
Age: 84
End: 2019-01-09

## 2019-03-14 ENCOUNTER — RX RENEWAL (OUTPATIENT)
Age: 84
End: 2019-03-14

## 2019-03-14 DIAGNOSIS — Z87.440 PERSONAL HISTORY OF URINARY (TRACT) INFECTIONS: ICD-10-CM

## 2019-03-15 LAB
APPEARANCE: CLEAR
BILIRUBIN URINE: NEGATIVE
BLOOD URINE: NEGATIVE
COLOR: COLORLESS
GLUCOSE QUALITATIVE U: NEGATIVE
KETONES URINE: NEGATIVE
LEUKOCYTE ESTERASE URINE: NEGATIVE
NITRITE URINE: NEGATIVE
PH URINE: 7.5
PROTEIN URINE: NEGATIVE
SPECIFIC GRAVITY URINE: 1.01
UROBILINOGEN URINE: NORMAL

## 2019-03-20 ENCOUNTER — APPOINTMENT (OUTPATIENT)
Dept: INTERNAL MEDICINE | Facility: CLINIC | Age: 84
End: 2019-03-20
Payer: MEDICARE

## 2019-03-20 VITALS
BODY MASS INDEX: 15.66 KG/M2 | DIASTOLIC BLOOD PRESSURE: 74 MMHG | HEART RATE: 86 BPM | RESPIRATION RATE: 15 BRPM | SYSTOLIC BLOOD PRESSURE: 132 MMHG | TEMPERATURE: 97.7 F | OXYGEN SATURATION: 96 % | WEIGHT: 97 LBS

## 2019-03-20 VITALS — DIASTOLIC BLOOD PRESSURE: 84 MMHG | SYSTOLIC BLOOD PRESSURE: 155 MMHG

## 2019-03-20 DIAGNOSIS — D64.9 ANEMIA, UNSPECIFIED: ICD-10-CM

## 2019-03-20 DIAGNOSIS — J30.9 ALLERGIC RHINITIS, UNSPECIFIED: ICD-10-CM

## 2019-03-20 PROCEDURE — 99215 OFFICE O/P EST HI 40 MIN: CPT

## 2019-03-20 RX ORDER — TRIAMCINOLONE ACETONIDE 1 MG/ML
0.1 LOTION TOPICAL
Qty: 60 | Refills: 0 | Status: DISCONTINUED | COMMUNITY
Start: 2019-02-01

## 2019-03-20 RX ORDER — CIPROFLOXACIN HYDROCHLORIDE 250 MG/1
250 TABLET, FILM COATED ORAL
Qty: 10 | Refills: 0 | Status: DISCONTINUED | COMMUNITY
Start: 2019-03-14 | End: 2019-03-20

## 2019-03-20 NOTE — ASSESSMENT
[FreeTextEntry1] : Physical examination shows an elderly woman pleasant and in no distress blood pressure was 155/84 pulse is 86 respirations 15 HEENT was unremarkable chest was clear cardiovascular exam was irregularly irregular with a 2/6 systolic murmur abdomen was soft extremities showed trace bilateral edema neurologic exam was nonfocal patient with advanced dementia pleasant and added amlodipine 2.5 mg a day to attempt to correct her bring down the blood pressure which is slightly elevated I have referred her to Dr. Deanne Mosqueda a urologist to attempt to understand her need to get up repeatedly during the night to use the bathroom however at that time she goes to the bathroom very little comes out her recent urinalysis was negative showing no signs of infection she also complains of frequent runny nose and she was referred to ear nose and throat surgeon for possible cryotherapy

## 2019-03-20 NOTE — HEALTH RISK ASSESSMENT
[No falls in past year] : Patient reported no falls in the past year [0] : 2) Feeling down, depressed, or hopeless: Not at all (0) [[id number]] : 0

## 2019-03-20 NOTE — PHYSICAL EXAM
[No Acute Distress] : no acute distress [Well Nourished] : well nourished [Well Developed] : well developed [Well-Appearing] : well-appearing [Normal Voice/Communication] : normal voice/communication [Normal Sclera/Conjunctiva] : normal sclera/conjunctiva [PERRL] : pupils equal round and reactive to light [EOMI] : extraocular movements intact [Normal Outer Ear/Nose] : the outer ears and nose were normal in appearance [Normal TMs] : both tympanic membranes were normal [Normal Oropharynx] : the oropharynx was normal [Normal Nasal Mucosa] : the nasal mucosa was normal [No JVD] : no jugular venous distention [Supple] : supple [No Lymphadenopathy] : no lymphadenopathy [Thyroid Normal, No Nodules] : the thyroid was normal and there were no nodules present [No Respiratory Distress] : no respiratory distress  [Clear to Auscultation] : lungs were clear to auscultation bilaterally [No Accessory Muscle Use] : no accessory muscle use [Normal Percussion] : the chest was normal to percussion [Normal Rate] : normal rate  [Regular Rhythm] : with a regular rhythm [Normal S1, S2] : normal S1 and S2 [No Carotid Bruits] : no carotid bruits [No Murmur] : no murmur heard [No Abdominal Bruit] : a ~M bruit was not heard ~T in the abdomen [Pedal Pulses Present] : the pedal pulses are present [No Varicosities] : no varicosities [No Edema] : there was no peripheral edema [No Extremity Clubbing/Cyanosis] : no extremity clubbing/cyanosis [No Palpable Aorta] : no palpable aorta [Declined Breast Exam] : declined breast exam  [Soft] : abdomen soft [Non Tender] : non-tender [No Masses] : no abdominal mass palpated [Non-distended] : non-distended [No HSM] : no HSM [Normal Bowel Sounds] : normal bowel sounds [No Hernias] : no hernias [Normal Supraclavicular Nodes] : no supraclavicular lymphadenopathy [Normal Axillary Nodes] : no axillary lymphadenopathy [Normal Posterior Cervical Nodes] : no posterior cervical lymphadenopathy [Normal Femoral Nodes] : no femoral lymphadenopathy [Normal Anterior Cervical Nodes] : no anterior cervical lymphadenopathy [Normal Inguinal Nodes] : no inguinal lymphadenopathy [No CVA Tenderness] : no CVA  tenderness [No Spinal Tenderness] : no spinal tenderness [No Joint Swelling] : no joint swelling [Grossly Normal Strength/Tone] : grossly normal strength/tone [Normal Gait] : normal gait [No Rash] : no rash [Coordination Grossly Intact] : coordination grossly intact [Deep Tendon Reflexes (DTR)] : deep tendon reflexes were 2+ and symmetric [No Focal Deficits] : no focal deficits [Speech Grossly Normal] : speech grossly normal [Memory Grossly Normal] : memory grossly normal [Normal Affect] : the affect was normal [Alert and Oriented x3] : oriented to person, place, and time [Normal Mood] : the mood was normal [Normal Insight/Judgement] : insight and judgment were intact [Kyphosis] : no kyphosis [Scoliosis] : no scoliosis

## 2019-03-20 NOTE — REVIEW OF SYSTEMS
[Fatigue] : fatigue [Hearing Loss] : hearing loss [Nasal Discharge] : nasal discharge [Postnasal Drip] : postnasal drip [Constipation] : constipation [Nocturia] : nocturia [Poor Libido] : poor libido [Joint Stiffness] : joint stiffness [Muscle Weakness] : muscle weakness [Back Pain] : back pain [Insomnia] : insomnia [Anxiety] : anxiety [Depression] : depression [Negative] : Heme/Lymph [Fever] : no fever [Hot Flashes] : no hot flashes [Chills] : no chills [Night Sweats] : no night sweats [Recent Change In Weight] : ~T no recent weight change [Discharge] : no discharge [Pain] : no pain [Redness] : no redness [Vision Problems] : no vision problems [Dryness] : no dryness  [Itching] : no itching [Hoarseness] : no hoarseness [Earache] : no earache [Sore Throat] : no sore throat [Chest Pain] : no chest pain [Palpitations] : no palpitations [Leg Claudication] : no leg claudication [Lower Ext Edema] : no lower extremity edema [Paroysmal Nocturnal Dyspnea] : no paroysmal nocturnal dyspnea [Orthopnea] : no orthopnea [Shortness Of Breath] : no shortness of breath [Wheezing] : no wheezing [Cough] : no cough [Dyspnea on Exertion] : no dyspnea on exertion [Abdominal Pain] : no abdominal pain [Nausea] : no nausea [Diarrhea] : diarrhea [Vomiting] : no vomiting [Heartburn] : no heartburn [Melena] : no melena [Dysuria] : no dysuria [Incontinence] : no incontinence [Hematuria] : no hematuria [Frequency] : no frequency [Vaginal Discharge] : no vaginal discharge [Dysmenorrhea] : no dysmenorrhea [Joint Pain] : no joint pain [Joint Swelling] : no joint swelling [Muscle Pain] : no muscle pain [Mole Changes] : no mole changes [Nail Changes] : no nail changes [Hair Changes] : no hair changes [Skin Rash] : no skin rash [Headache] : no headache [Dizziness] : no dizziness [Fainting] : no fainting [Confusion] : no confusion [Memory Loss] : no memory loss [Unsteady Walking] : no ataxia [Easy Bleeding] : no easy bleeding [Suicidal] : not suicidal [Easy Bruising] : no easy bruising [Swollen Glands] : no swollen glands

## 2019-03-20 NOTE — HISTORY OF PRESENT ILLNESS
[FreeTextEntry1] : Comes to the office for followup to review her medication and discuss her overall health issues are urinary frequency/urgency , recent elevated blood pressure and repetitive head motions [de-identified] : 89-year-old woman comes to the office accompanied by her daughter with a history of hypertension anemia atrial fibrillation hyper lipidemia hypothyroidism allergic rhinitis and urinary dysfunction her main complaint still with frequent need to blow her nose and her frequent urge to urinate despite little coming out soon urinalysis and culture were negative she also has episodes where she repetitively bobs her head down she denies temperature chills sweats or myalgias she's had no headaches sinus congestion sore throat cough wheezing pleurisy chest pain shortness of breath exertional dyspnea lightheadedness palpitations dizziness vertigo or syncope he denies abdominal pain nausea vomiting diarrhea constipation bright red blood per rectum or black stools she has diffuse body aches in her joints but no one area worse than others her appetite has been good and her weight is stable

## 2019-04-10 ENCOUNTER — RX RENEWAL (OUTPATIENT)
Age: 84
End: 2019-04-10

## 2019-04-13 ENCOUNTER — EMERGENCY (EMERGENCY)
Facility: HOSPITAL | Age: 84
LOS: 1 days | Discharge: HOME | End: 2019-04-13
Attending: EMERGENCY MEDICINE | Admitting: EMERGENCY MEDICINE
Payer: MEDICARE

## 2019-04-13 VITALS
DIASTOLIC BLOOD PRESSURE: 73 MMHG | RESPIRATION RATE: 17 BRPM | OXYGEN SATURATION: 95 % | WEIGHT: 97 LBS | SYSTOLIC BLOOD PRESSURE: 185 MMHG | HEART RATE: 90 BPM | TEMPERATURE: 98 F | HEIGHT: 64 IN

## 2019-04-13 DIAGNOSIS — R25.9 UNSPECIFIED ABNORMAL INVOLUNTARY MOVEMENTS: ICD-10-CM

## 2019-04-13 DIAGNOSIS — Z98.41 CATARACT EXTRACTION STATUS, RIGHT EYE: Chronic | ICD-10-CM

## 2019-04-13 DIAGNOSIS — Z96.642 PRESENCE OF LEFT ARTIFICIAL HIP JOINT: Chronic | ICD-10-CM

## 2019-04-13 PROCEDURE — 99283 EMERGENCY DEPT VISIT LOW MDM: CPT

## 2019-04-13 PROCEDURE — 99282 EMERGENCY DEPT VISIT SF MDM: CPT

## 2019-04-13 NOTE — ED PROVIDER NOTE - NSFOLLOWUPINSTRUCTIONS_ED_ALL_ED_FT
Follow up with your pmd within 48 hours- show copies of ER results.   Follow up with Dr. Reddy.   Continue previous prescribed medications.   Return to the ED if any worsening or persistent symptoms.

## 2019-04-13 NOTE — ED ADULT NURSE NOTE - OBJECTIVE STATEMENT
daughter reports patient is 'bobbing' head forward and backwards and c/o "crinkling" sound in head, states she had these same symptoms a year ago and was seen at [facility] and had no definitive Dx.  recently saw her neurologist and was started on antidepressants

## 2019-04-13 NOTE — ED PROVIDER NOTE - ATTENDING CONTRIBUTION TO CARE
javier with [facility]ance[facility]. Patient with daughter who presents for an acute on chronic event of head bobbing . Already seen an evaluated by Dr Reddy this past week and started on Celexa. She has no fever or chills. She is not bobbing her head now. When it happens, she is able to speak and through prompting, is able to stop. She is hard of hearing (at baseline) but pleasant, offers no complaints, and there are no neuro deficits on exam while in the bed.  no acute concerns. Recc f/u with pcp and [facility] outpt. stable for d.c. I performed a face to face bedside interview with patient regarding history of present illness, review of symptoms and past medical history. I completed an independent physical exam.  I have discussed the patient's plan of care with Physician Assistant (PA). I agree with note as stated above, having amended the EMR as needed to reflect my findings.   This includes History of Present Illness, HIV, Past Medical/Surgical/Family/Social History, Allergies and Home Medications, Review of Systems, Physical Exam, and any Progress Notes during the time I functioned as the attending physician for this patient.

## 2019-04-13 NOTE — ED ADULT NURSE NOTE - NSIMPLEMENTINTERV_GEN_ALL_ED
Implemented All Fall with Harm Risk Interventions:  [place] to call system. Call bell, personal items and telephone within reach. Instruct patient to call for assistance. Room bathroom lighting operational. Non-slip footwear when patient is off stretcher. Physically safe environment: no spills, clutter or unnecessary equipment. Stretcher in lowest position, wheels locked, appropriate side rails in place. Provide visual cue, wrist band, yellow gown, etc. Monitor gait and stability. Monitor for mental status changes and reorient to person, place, and time. Review medications for side effects contributing to fall risk. Reinforce activity limits and safety measures with patient and family. Provide visual clues: red socks.

## 2019-04-13 NOTE — ED ADULT TRIAGE NOTE - CHIEF COMPLAINT QUOTE
As per daughter pt has been "bobbing" her head for the last 3-4 weeks and also c/o "crackling"  in her head, pt was seen by Dr. Reddy last Tuesday and was prescribed with an anti depressant

## 2019-04-13 NOTE — ED PROVIDER NOTE - OBJECTIVE STATEMENT
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 Similar symptoms a few years ago, and workup done showing no acute signs. Now in the last few weeks similar symptoms. Daughter reports at times, she will bobble her head and in an catatonic state. Pt seen by Dr. burroughs this past week, started on antidepressant medication on 4/9. 89 yr old female with hx of HTN, Hypothyroid, a.fib, s/p hip fracture/ replacement lives with daughter presents c/o intermittent " bobbing" of her head and " crackling" in her head. Similar symptoms a few years ago, and workup done showing no acute signs. Now in the last few weeks similar symptoms. Daughter reports at times, she will bobble her head and in an catatonic state. Pt seen by Dr. burroughs this past week, started on antidepressant medication on 4/9. Denies any fever, chills, chest pain, new weakness or numbness.  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 Pt loss her son, son in law and , as per daughter is sad at times.

## 2019-04-13 NOTE — ED PROVIDER NOTE - CLINICAL SUMMARY MEDICAL DECISION MAKING FREE TEXT BOX
89 yr old female with hx of HTN, Hypothyroid, a.fib, s/p hip fracture/ replacement lives with daughter presents c/o intermittent " bobbing" of her head and " crackling" in her head. Similar symptoms a few years ago, and workup done showing no acute signs. Now in the last few weeks similar symptoms. Daughter reports at times, she will bobble her head and in an catatonic state. Pt seen by Dr. burroughs this past week, started on antidepressant medication on 4/9. Denies any fever, chills, chest pain, new weakness or numbness.  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 Pt loss her son, son in law and , as per daughter is sad at times.  exam normal, neurologically intact, no movements on exam- pt stable for discharge home and to follow up with pmd/ manjeet, daughter at bedside and agrees with plan.

## 2019-04-13 NOTE — ED PROVIDER NOTE - CONSTITUTIONAL APPEARANCE HYGIENE, MLM
Paged on call OB for PRN anxiety medication. Waiting for call back.     [id number]: Dr. Cleveland called back. Order hydroxyzine 25-50 mg Q4H. TORB. Give 50 mg tonight. TORB.   well appearing

## 2019-04-30 ENCOUNTER — EMERGENCY (EMERGENCY)
Facility: HOSPITAL | Age: 84
LOS: 0 days | Discharge: HOME | End: 2019-04-30
Attending: EMERGENCY MEDICINE | Admitting: EMERGENCY MEDICINE
Payer: MEDICARE

## 2019-04-30 VITALS
WEIGHT: 100.09 LBS | RESPIRATION RATE: 16 BRPM | DIASTOLIC BLOOD PRESSURE: 93 MMHG | TEMPERATURE: 98 F | SYSTOLIC BLOOD PRESSURE: 160 MMHG | HEART RATE: 85 BPM | HEIGHT: 65 IN | OXYGEN SATURATION: 97 %

## 2019-04-30 VITALS — HEART RATE: 79 BPM | SYSTOLIC BLOOD PRESSURE: 146 MMHG | DIASTOLIC BLOOD PRESSURE: 89 MMHG
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
## 2019-04-30 DIAGNOSIS — E03.9 HYPOTHYROIDISM, UNSPECIFIED: ICD-10-CM

## 2019-04-30 DIAGNOSIS — Z96.642 PRESENCE OF LEFT ARTIFICIAL HIP JOINT: Chronic | ICD-10-CM

## 2019-04-30 DIAGNOSIS — Z96.642 PRESENCE OF LEFT ARTIFICIAL HIP JOINT: ICD-10-CM

## 2019-04-30 DIAGNOSIS — Z79.82 LONG TERM (CURRENT) USE OF ASPIRIN: ICD-10-CM

## 2019-04-30 DIAGNOSIS — S81.811A LACERATION WITHOUT FOREIGN BODY, RIGHT LOWER LEG, INITIAL ENCOUNTER: ICD-10-CM

## 2019-04-30 DIAGNOSIS — I10 ESSENTIAL (PRIMARY) HYPERTENSION: ICD-10-CM

## 2019-04-30 DIAGNOSIS — Z98.41 CATARACT EXTRACTION STATUS, RIGHT EYE: Chronic | ICD-10-CM

## 2019-04-30 DIAGNOSIS — I48.91 UNSPECIFIED ATRIAL FIBRILLATION: ICD-10-CM

## 2019-04-30 DIAGNOSIS — M25.561 PAIN IN RIGHT KNEE: ICD-10-CM
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
## 2019-04-30 PROCEDURE — 71045 X-RAY EXAM CHEST 1 VIEW: CPT | Mod: 26

## 2019-04-30 PROCEDURE — 73562 X-RAY EXAM OF KNEE 3: CPT | Mod: 26,RT

## 2019-04-30 PROCEDURE — 99284 EMERGENCY DEPT VISIT MOD MDM: CPT | Mod: 25

## 2019-04-30 RX ORDER — TETANUS TOXOID, REDUCED DIPHTHERIA TOXOID AND ACELLULAR PERTUSSIS VACCINE, ADSORBED 5; 2.5; 8; 8; 2.5 [IU]/.5ML; [IU]/.5ML; UG/.5ML; UG/.5ML; UG/.5ML
0.5 SUSPENSION INTRAMUSCULAR ONCE
Qty: 0 | Refills: 0 | Status: COMPLETED | OUTPATIENT
Start: 2019-04-30 | End: 2019-04-30

## 2019-04-30 RX ADMIN — TETANUS TOXOID, REDUCED DIPHTHERIA TOXOID AND ACELLULAR PERTUSSIS VACCINE, ADSORBED 0.5 MILLILITER(S): 5; 2.5; 8; 8; 2.5 SUSPENSION INTRAMUSCULAR at 05:20

## 2019-04-30 NOTE — ED ADULT NURSE NOTE - NSIMPLEMENTINTERV_GEN_ALL_ED
Implemented All Fall with Harm Risk Interventions:  [place] to call system. Call bell, personal items and telephone within reach. Instruct patient to call for assistance. Room bathroom lighting operational. Non-slip footwear when patient is off stretcher. Physically safe environment: no spills, clutter or unnecessary equipment. Stretcher in lowest position, wheels locked, appropriate side rails in place. Provide visual cue, wrist band, yellow gown, etc. Monitor gait and stability. Monitor for mental status changes and reorient to person, place, and time. Review medications for side effects contributing to fall risk. Reinforce activity limits and safety measures with patient and family. Provide visual clues: red socks.

## 2019-04-30 NOTE — ED PROVIDER NOTE - OBJECTIVE STATEMENT
89 y/o female in ED c/o right knee pain with abrasion x 1 hr s/p slip and fall out of bed tonight.   pt states finished using commode and was attempting to get back into bed, missed the bed and slid down to floor.   pt denies any LOC, HA, neck pain, cp, sob, n/v/d/a/dbd pain.

## 2019-04-30 NOTE — ED PROVIDER NOTE - CARE PLAN
Principal Discharge DX:	Fall, initial encounter  Secondary Diagnosis:	Skin tear of lower leg without complication, right, initial encounter

## 2019-04-30 NOTE — ED ADULT NURSE NOTE - OBJECTIVE STATEMENT
Pt presents to ED for fall. According to EMS pt woke up from sleep to use commode when she tripped and fell. Pt denies hitting head, denies LOC, denies AC. Pt suffered hematoma to RLE. Denies pain or discomfort.

## 2019-04-30 NOTE — ED PROVIDER NOTE - NSFOLLOWUPINSTRUCTIONS_ED_ALL_ED_FT
please follow up with your doctor in 3-5 days.   keep wound clean and dry.   return to ED for any concerns

## 2019-05-06 ENCOUNTER — OTHER (OUTPATIENT)
Age: 84
End: 2019-05-06

## 2019-05-06 LAB
ALBUMIN SERPL ELPH-MCNC: 4.1 G/DL
ALP BLD-CCNC: 61 U/L
ALT SERPL-CCNC: 10 U/L
ANION GAP SERPL CALC-SCNC: 13 MMOL/L
APPEARANCE: CLEAR
AST SERPL-CCNC: 20 U/L
BASOPHILS # BLD AUTO: 0.06 K/UL
BASOPHILS NFR BLD AUTO: 0.9 %
BILIRUB SERPL-MCNC: 1.2 MG/DL
BILIRUBIN URINE: NEGATIVE
BLOOD URINE: NEGATIVE
BUN SERPL-MCNC: 18 MG/DL
CALCIUM SERPL-MCNC: 9.4 MG/DL
CHLORIDE SERPL-SCNC: 97 MMOL/L
CHOLEST SERPL-MCNC: 252 MG/DL
CHOLEST/HDLC SERPL: 3.6 RATIO
CO2 SERPL-SCNC: 29 MMOL/L
COLOR: YELLOW
CREAT SERPL-MCNC: 0.82 MG/DL
CRP SERPL HS-MCNC: 0.85 MG/L
EOSINOPHIL # BLD AUTO: 0.09 K/UL
EOSINOPHIL NFR BLD AUTO: 1.4 %
ESTIMATED AVERAGE GLUCOSE: 114 MG/DL
GLUCOSE QUALITATIVE U: NEGATIVE
GLUCOSE SERPL-MCNC: 95 MG/DL
HBA1C MFR BLD HPLC: 5.6 %
HCT VFR BLD CALC: 42.2 %
HDLC SERPL-MCNC: 71 MG/DL
HGB BLD-MCNC: 13.4 G/DL
IMM GRANULOCYTES NFR BLD AUTO: 0.3 %
KETONES URINE: NEGATIVE
LDLC SERPL CALC-MCNC: 159 MG/DL
LEUKOCYTE ESTERASE URINE: NEGATIVE
LYMPHOCYTES # BLD AUTO: 1.24 K/UL
LYMPHOCYTES NFR BLD AUTO: 18.8 %
MAN DIFF?: NORMAL
MCHC RBC-ENTMCNC: 29.6 PG
MCHC RBC-ENTMCNC: 31.8 GM/DL
MCV RBC AUTO: 93.4 FL
MONOCYTES # BLD AUTO: 0.55 K/UL
MONOCYTES NFR BLD AUTO: 8.4 %
NEUTROPHILS # BLD AUTO: 4.62 K/UL
NEUTROPHILS NFR BLD AUTO: 70.2 %
NITRITE URINE: NEGATIVE
PH URINE: 8
PLATELET # BLD AUTO: 237 K/UL
POTASSIUM SERPL-SCNC: 4.9 MMOL/L
PROT SERPL-MCNC: 6.5 G/DL
PROTEIN URINE: NORMAL
RBC # BLD: 4.52 M/UL
RBC # FLD: 14.4 %
SODIUM SERPL-SCNC: 139 MMOL/L
SPECIFIC GRAVITY URINE: 1.02
T4 FREE SERPL-MCNC: 1.4 NG/DL
TRIGL SERPL-MCNC: 112 MG/DL
TSH SERPL-ACNC: 2.25 UIU/ML
UROBILINOGEN URINE: NORMAL
VIT B12 SERPL-MCNC: 333 PG/ML
WBC # FLD AUTO: 6.58 K/UL

## 2019-05-10 LAB — 25(OH)D3 SERPL-MCNC: 21.2 NG/ML

## 2019-06-08 ENCOUNTER — RX RENEWAL (OUTPATIENT)
Age: 84
End: 2019-06-08

## 2019-06-17 ENCOUNTER — RX RENEWAL (OUTPATIENT)
Age: 84
End: 2019-06-17

## 2019-07-05 ENCOUNTER — RX RENEWAL (OUTPATIENT)
Age: 84
End: 2019-07-05

## 2019-07-29 ENCOUNTER — RX RENEWAL (OUTPATIENT)
Age: 84
End: 2019-07-29

## 2019-09-04 ENCOUNTER — INPATIENT (INPATIENT)
Facility: HOSPITAL | Age: 84
LOS: 2 days | Discharge: HOME | DRG: 308 | End: 2019-09-07
Attending: STUDENT IN AN ORGANIZED HEALTH CARE EDUCATION/TRAINING PROGRAM | Admitting: INTERNAL MEDICINE
Payer: MEDICARE

## 2019-09-04 VITALS
WEIGHT: 89.95 LBS | TEMPERATURE: 98 F | SYSTOLIC BLOOD PRESSURE: 159 MMHG | HEART RATE: 67 BPM | DIASTOLIC BLOOD PRESSURE: 78 MMHG | OXYGEN SATURATION: 97 % | RESPIRATION RATE: 18 BRPM

## 2019-09-04 DIAGNOSIS — Z96.642 PRESENCE OF LEFT ARTIFICIAL HIP JOINT: Chronic | ICD-10-CM

## 2019-09-04 DIAGNOSIS — Z98.41 CATARACT EXTRACTION STATUS, RIGHT EYE: Chronic | ICD-10-CM

## 2019-09-04 LAB
ALBUMIN SERPL ELPH-MCNC: 3.2 G/DL — LOW (ref 3.3–5)
ALP SERPL-CCNC: 66 U/L — SIGNIFICANT CHANGE UP (ref 40–120)
ALT FLD-CCNC: 16 U/L — SIGNIFICANT CHANGE UP (ref 12–78)
ANION GAP SERPL CALC-SCNC: 5 MMOL/L — SIGNIFICANT CHANGE UP (ref 5–17)
APPEARANCE UR: CLEAR — SIGNIFICANT CHANGE UP
APTT BLD: 27.3 SEC — LOW (ref 27.5–36.3)
AST SERPL-CCNC: 26 U/L — SIGNIFICANT CHANGE UP (ref 15–37)
BASOPHILS # BLD AUTO: 0.05 K/UL — SIGNIFICANT CHANGE UP (ref 0–0.2)
BASOPHILS NFR BLD AUTO: 0.8 % — SIGNIFICANT CHANGE UP (ref 0–2)
BILIRUB SERPL-MCNC: 1.3 MG/DL — HIGH (ref 0.2–1.2)
BILIRUB UR-MCNC: NEGATIVE — SIGNIFICANT CHANGE UP
BUN SERPL-MCNC: 19 MG/DL — SIGNIFICANT CHANGE UP (ref 7–23)
CALCIUM SERPL-MCNC: 9.1 MG/DL — SIGNIFICANT CHANGE UP (ref 8.5–10.1)
CHLORIDE SERPL-SCNC: 101 MMOL/L — SIGNIFICANT CHANGE UP (ref 96–108)
CK SERPL-CCNC: 54 U/L — SIGNIFICANT CHANGE UP (ref 26–192)
CO2 SERPL-SCNC: 30 MMOL/L — SIGNIFICANT CHANGE UP (ref 22–31)
COLOR SPEC: YELLOW — SIGNIFICANT CHANGE UP
CREAT SERPL-MCNC: 0.86 MG/DL — SIGNIFICANT CHANGE UP (ref 0.5–1.3)
DIFF PNL FLD: ABNORMAL
EOSINOPHIL # BLD AUTO: 0.1 K/UL — SIGNIFICANT CHANGE UP (ref 0–0.5)
EOSINOPHIL NFR BLD AUTO: 1.7 % — SIGNIFICANT CHANGE UP (ref 0–6)
GLUCOSE SERPL-MCNC: 89 MG/DL — SIGNIFICANT CHANGE UP (ref 70–99)
GLUCOSE UR QL: NEGATIVE MG/DL — SIGNIFICANT CHANGE UP
HCT VFR BLD CALC: 38.8 % — SIGNIFICANT CHANGE UP (ref 34.5–45)
HGB BLD-MCNC: 12.2 G/DL — SIGNIFICANT CHANGE UP (ref 11.5–15.5)
IMM GRANULOCYTES NFR BLD AUTO: 0.3 % — SIGNIFICANT CHANGE UP (ref 0–1.5)
INR BLD: 0.96 RATIO — SIGNIFICANT CHANGE UP (ref 0.88–1.16)
KETONES UR-MCNC: NEGATIVE — SIGNIFICANT CHANGE UP
LEUKOCYTE ESTERASE UR-ACNC: ABNORMAL
LYMPHOCYTES # BLD AUTO: 1.23 K/UL — SIGNIFICANT CHANGE UP (ref 1–3.3)
LYMPHOCYTES # BLD AUTO: 20.8 % — SIGNIFICANT CHANGE UP (ref 13–44)
MCHC RBC-ENTMCNC: 29.9 PG — SIGNIFICANT CHANGE UP (ref 27–34)
MCHC RBC-ENTMCNC: 31.4 GM/DL — LOW (ref 32–36)
MCV RBC AUTO: 95.1 FL — SIGNIFICANT CHANGE UP (ref 80–100)
MONOCYTES # BLD AUTO: 0.56 K/UL — SIGNIFICANT CHANGE UP (ref 0–0.9)
MONOCYTES NFR BLD AUTO: 9.5 % — SIGNIFICANT CHANGE UP (ref 2–14)
NEUTROPHILS # BLD AUTO: 3.94 K/UL — SIGNIFICANT CHANGE UP (ref 1.8–7.4)
NEUTROPHILS NFR BLD AUTO: 66.9 % — SIGNIFICANT CHANGE UP (ref 43–77)
NITRITE UR-MCNC: NEGATIVE — SIGNIFICANT CHANGE UP
PH UR: 6 — SIGNIFICANT CHANGE UP (ref 5–8)
PLATELET # BLD AUTO: 176 K/UL — SIGNIFICANT CHANGE UP (ref 150–400)
POTASSIUM SERPL-MCNC: 4.6 MMOL/L — SIGNIFICANT CHANGE UP (ref 3.5–5.3)
POTASSIUM SERPL-SCNC: 4.6 MMOL/L — SIGNIFICANT CHANGE UP (ref 3.5–5.3)
PROT SERPL-MCNC: 6.5 GM/DL — SIGNIFICANT CHANGE UP (ref 6–8.3)
PROT UR-MCNC: 15 MG/DL
PROTHROM AB SERPL-ACNC: 10.6 SEC — SIGNIFICANT CHANGE UP (ref 10–12.9)
RBC # BLD: 4.08 M/UL — SIGNIFICANT CHANGE UP (ref 3.8–5.2)
RBC # FLD: 14.1 % — SIGNIFICANT CHANGE UP (ref 10.3–14.5)
SODIUM SERPL-SCNC: 136 MMOL/L — SIGNIFICANT CHANGE UP (ref 135–145)
SP GR SPEC: 1.01 — SIGNIFICANT CHANGE UP (ref 1.01–1.02)
TROPONIN I SERPL-MCNC: <0.015 NG/ML — SIGNIFICANT CHANGE UP (ref 0.01–0.04)
UROBILINOGEN FLD QL: NEGATIVE MG/DL — SIGNIFICANT CHANGE UP
WBC # BLD: 5.9 K/UL — SIGNIFICANT CHANGE UP (ref 3.8–10.5)
WBC # FLD AUTO: 5.9 K/UL — SIGNIFICANT CHANGE UP (ref 3.8–10.5)

## 2019-09-04 PROCEDURE — 71045 X-RAY EXAM CHEST 1 VIEW: CPT | Mod: 26

## 2019-09-04 PROCEDURE — 93010 ELECTROCARDIOGRAM REPORT: CPT

## 2019-09-04 PROCEDURE — 70450 CT HEAD/BRAIN W/O DYE: CPT | Mod: 26

## 2019-09-04 NOTE — ED ADULT NURSE NOTE - OBJECTIVE STATEMENT
Daughter states that 3 hours ago she noticed that the patient had weakness on left sided while getting off of commode. Daughter states pt has been "foggy". +5 strength on lower extremities, +5 strength upper right extremity, +4 strength on upper left extremity with minor drift. pt denies numbness/ tingling to extremities/ face. pt denies pain. Ataxia/ word slurring present. Daughter states pt has had ataxia (appx 5 years), decreased mobility and ability to ambulate (ongoing decrease). pt MITCHELL

## 2019-09-04 NOTE — ED PROVIDER NOTE - CLINICAL SUMMARY MEDICAL DECISION MAKING FREE TEXT BOX
89 y/o female history of Afib on ASA only. BIBA from home after daughter noticed left arm weakness with contracture. Symptoms noticed around 1:30pm. No known fall. Pt arrived 3 hours after post symptom onset. plan: pt is not a TPA candidate. EKG, CT head, XR, labs, serial troponin, dysphasia screen. monitor, observe, reassess

## 2019-09-04 NOTE — ED PROVIDER NOTE - OBJECTIVE STATEMENT
89 y/o female with a PMHx of Afib, HTN, hypothyroid, other iron deficient anemia, presents to the ED c/o weakness today. Per pt's daughter, daughter came home around 1:30pm today and found pt feeling weak. Around the same time, pt's home aide noted that pt was drooping in her wheelchair. Daughter states that today, when pt was going onto the toilet, she was having difficulty with her left arm, which is normally extended to pull herself off the toilet. Today, it was only partially extended. Daughter also reports pt increasing difficulty getting out of bed in the morning. She is confined to a wheelchair, due to severe ataxia. On baby ASA. No AC medications due to frequent falls. PCP: Dr. Levi Mota.

## 2019-09-04 NOTE — ED PROVIDER NOTE - MUSCULOSKELETAL, MLM
Spine appears normal, no muscle or joint tenderness. MAEx4. no focal swelling tenderness. +Left arm is partially contracted with mild motor weakness.

## 2019-09-04 NOTE — ED PROVIDER NOTE - ENMT, MLM
Airway patent, Nasal mucosa clear. Mouth with normal mucosa. Throat has no vesicles, no oropharyngeal exudates and uvula is midline. +mucous membranes slightly dry

## 2019-09-04 NOTE — ED PROVIDER NOTE - CARDIAC, MLM
Heart sounds S1, S2.  No murmurs, rubs or gallops. +irregularly irregular rate and rhythm, +adequately radial pulse.

## 2019-09-04 NOTE — ED ADULT TRIAGE NOTE - CHIEF COMPLAINT QUOTE
As per EMS, daughter states that 3 hours ago she noticed that the patient had weakness on left side while getting off of commode. Dr. Venegas at bedside to evaluate patient.

## 2019-09-04 NOTE — ED PROVIDER NOTE - CARE PLAN
Principal Discharge DX:	Arm weakness  Secondary Diagnosis:	Acute cystitis without hematuria  Secondary Diagnosis:	Dysphagia, unspecified type

## 2019-09-05 DIAGNOSIS — D50.8 OTHER IRON DEFICIENCY ANEMIAS: ICD-10-CM

## 2019-09-05 DIAGNOSIS — E03.9 HYPOTHYROIDISM, UNSPECIFIED: ICD-10-CM

## 2019-09-05 DIAGNOSIS — R29.898 OTHER SYMPTOMS AND SIGNS INVOLVING THE MUSCULOSKELETAL SYSTEM: ICD-10-CM

## 2019-09-05 DIAGNOSIS — I48.2 CHRONIC ATRIAL FIBRILLATION: ICD-10-CM

## 2019-09-05 DIAGNOSIS — R13.10 DYSPHAGIA, UNSPECIFIED: ICD-10-CM

## 2019-09-05 DIAGNOSIS — I10 ESSENTIAL (PRIMARY) HYPERTENSION: ICD-10-CM

## 2019-09-05 LAB
ANION GAP SERPL CALC-SCNC: 6 MMOL/L — SIGNIFICANT CHANGE UP (ref 5–17)
BASOPHILS # BLD AUTO: 0.04 K/UL — SIGNIFICANT CHANGE UP (ref 0–0.2)
BASOPHILS NFR BLD AUTO: 0.5 % — SIGNIFICANT CHANGE UP (ref 0–2)
BUN SERPL-MCNC: 18 MG/DL — SIGNIFICANT CHANGE UP (ref 7–23)
CALCIUM SERPL-MCNC: 8.9 MG/DL — SIGNIFICANT CHANGE UP (ref 8.5–10.1)
CHLORIDE SERPL-SCNC: 102 MMOL/L — SIGNIFICANT CHANGE UP (ref 96–108)
CO2 SERPL-SCNC: 30 MMOL/L — SIGNIFICANT CHANGE UP (ref 22–31)
CREAT SERPL-MCNC: 0.82 MG/DL — SIGNIFICANT CHANGE UP (ref 0.5–1.3)
EOSINOPHIL # BLD AUTO: 0.01 K/UL — SIGNIFICANT CHANGE UP (ref 0–0.5)
EOSINOPHIL NFR BLD AUTO: 0.1 % — SIGNIFICANT CHANGE UP (ref 0–6)
GLUCOSE SERPL-MCNC: 99 MG/DL — SIGNIFICANT CHANGE UP (ref 70–99)
HCT VFR BLD CALC: 38.2 % — SIGNIFICANT CHANGE UP (ref 34.5–45)
HGB BLD-MCNC: 12.2 G/DL — SIGNIFICANT CHANGE UP (ref 11.5–15.5)
IMM GRANULOCYTES NFR BLD AUTO: 0.5 % — SIGNIFICANT CHANGE UP (ref 0–1.5)
LACTATE SERPL-SCNC: 0.4 MMOL/L — LOW (ref 0.7–2)
LYMPHOCYTES # BLD AUTO: 0.68 K/UL — LOW (ref 1–3.3)
LYMPHOCYTES # BLD AUTO: 8.3 % — LOW (ref 13–44)
MCHC RBC-ENTMCNC: 30.2 PG — SIGNIFICANT CHANGE UP (ref 27–34)
MCHC RBC-ENTMCNC: 31.9 GM/DL — LOW (ref 32–36)
MCV RBC AUTO: 94.6 FL — SIGNIFICANT CHANGE UP (ref 80–100)
MONOCYTES # BLD AUTO: 0.44 K/UL — SIGNIFICANT CHANGE UP (ref 0–0.9)
MONOCYTES NFR BLD AUTO: 5.4 % — SIGNIFICANT CHANGE UP (ref 2–14)
NEUTROPHILS # BLD AUTO: 6.97 K/UL — SIGNIFICANT CHANGE UP (ref 1.8–7.4)
NEUTROPHILS NFR BLD AUTO: 85.2 % — HIGH (ref 43–77)
PLATELET # BLD AUTO: 182 K/UL — SIGNIFICANT CHANGE UP (ref 150–400)
POTASSIUM SERPL-MCNC: 4.4 MMOL/L — SIGNIFICANT CHANGE UP (ref 3.5–5.3)
POTASSIUM SERPL-SCNC: 4.4 MMOL/L — SIGNIFICANT CHANGE UP (ref 3.5–5.3)
RBC # BLD: 4.04 M/UL — SIGNIFICANT CHANGE UP (ref 3.8–5.2)
RBC # FLD: 14 % — SIGNIFICANT CHANGE UP (ref 10.3–14.5)
SODIUM SERPL-SCNC: 138 MMOL/L — SIGNIFICANT CHANGE UP (ref 135–145)
TROPONIN I SERPL-MCNC: <0.015 NG/ML — SIGNIFICANT CHANGE UP (ref 0.01–0.04)
WBC # BLD: 8.18 K/UL — SIGNIFICANT CHANGE UP (ref 3.8–10.5)
WBC # FLD AUTO: 8.18 K/UL — SIGNIFICANT CHANGE UP (ref 3.8–10.5)

## 2019-09-05 PROCEDURE — 92523 SPEECH SOUND LANG COMPREHEN: CPT | Mod: GN

## 2019-09-05 PROCEDURE — 92610 EVALUATE SWALLOWING FUNCTION: CPT | Mod: GN

## 2019-09-05 PROCEDURE — 36415 COLL VENOUS BLD VENIPUNCTURE: CPT

## 2019-09-05 PROCEDURE — 87040 BLOOD CULTURE FOR BACTERIA: CPT

## 2019-09-05 PROCEDURE — 83605 ASSAY OF LACTIC ACID: CPT

## 2019-09-05 PROCEDURE — 85025 COMPLETE CBC W/AUTO DIFF WBC: CPT

## 2019-09-05 PROCEDURE — 99223 1ST HOSP IP/OBS HIGH 75: CPT

## 2019-09-05 PROCEDURE — 92507 TX SP LANG VOICE COMM INDIV: CPT | Mod: GN

## 2019-09-05 PROCEDURE — 80061 LIPID PANEL: CPT

## 2019-09-05 PROCEDURE — 80048 BASIC METABOLIC PNL TOTAL CA: CPT

## 2019-09-05 PROCEDURE — 84484 ASSAY OF TROPONIN QUANT: CPT

## 2019-09-05 PROCEDURE — 92526 ORAL FUNCTION THERAPY: CPT | Mod: GN

## 2019-09-05 RX ORDER — ASPIRIN/CALCIUM CARB/MAGNESIUM 324 MG
81 TABLET ORAL ONCE
Refills: 0 | Status: DISCONTINUED | OUTPATIENT
Start: 2019-09-05 | End: 2019-09-05

## 2019-09-05 RX ORDER — TROSPIUM CHLORIDE 20 MG/1
1 TABLET, FILM COATED ORAL
Qty: 0 | Refills: 0 | DISCHARGE

## 2019-09-05 RX ORDER — METOPROLOL TARTRATE 50 MG
25 TABLET ORAL DAILY
Refills: 0 | Status: DISCONTINUED | OUTPATIENT
Start: 2019-09-05 | End: 2019-09-07

## 2019-09-05 RX ORDER — CEFTRIAXONE 500 MG/1
1000 INJECTION, POWDER, FOR SOLUTION INTRAMUSCULAR; INTRAVENOUS ONCE
Refills: 0 | Status: COMPLETED | OUTPATIENT
Start: 2019-09-05 | End: 2019-09-05

## 2019-09-05 RX ORDER — SODIUM CHLORIDE 9 MG/ML
500 INJECTION INTRAMUSCULAR; INTRAVENOUS; SUBCUTANEOUS
Refills: 0 | Status: DISCONTINUED | OUTPATIENT
Start: 2019-09-05 | End: 2019-09-06

## 2019-09-05 RX ORDER — ASPIRIN/CALCIUM CARB/MAGNESIUM 324 MG
325 TABLET ORAL DAILY
Refills: 0 | Status: DISCONTINUED | OUTPATIENT
Start: 2019-09-05 | End: 2019-09-06

## 2019-09-05 RX ORDER — PREGABALIN 225 MG/1
1000 CAPSULE ORAL ONCE
Refills: 0 | Status: COMPLETED | OUTPATIENT
Start: 2019-09-05 | End: 2019-09-05

## 2019-09-05 RX ORDER — CEFTRIAXONE 500 MG/1
1 INJECTION, POWDER, FOR SOLUTION INTRAMUSCULAR; INTRAVENOUS EVERY 24 HOURS
Refills: 0 | Status: DISCONTINUED | OUTPATIENT
Start: 2019-09-05 | End: 2019-09-05

## 2019-09-05 RX ORDER — LOSARTAN POTASSIUM 100 MG/1
0 TABLET, FILM COATED ORAL
Qty: 0 | Refills: 0 | DISCHARGE

## 2019-09-05 RX ORDER — CEFTRIAXONE 500 MG/1
1 INJECTION, POWDER, FOR SOLUTION INTRAMUSCULAR; INTRAVENOUS EVERY 24 HOURS
Refills: 0 | Status: DISCONTINUED | OUTPATIENT
Start: 2019-09-05 | End: 2019-09-06

## 2019-09-05 RX ORDER — LEVOTHYROXINE SODIUM 125 MCG
25 TABLET ORAL DAILY
Refills: 0 | Status: DISCONTINUED | OUTPATIENT
Start: 2019-09-05 | End: 2019-09-07

## 2019-09-05 RX ORDER — ESCITALOPRAM OXALATE 10 MG/1
0 TABLET, FILM COATED ORAL
Qty: 0 | Refills: 0 | DISCHARGE

## 2019-09-05 RX ORDER — METOPROLOL TARTRATE 50 MG
1 TABLET ORAL
Qty: 0 | Refills: 0 | DISCHARGE

## 2019-09-05 RX ORDER — LOSARTAN POTASSIUM 100 MG/1
50 TABLET, FILM COATED ORAL DAILY
Refills: 0 | Status: DISCONTINUED | OUTPATIENT
Start: 2019-09-05 | End: 2019-09-07

## 2019-09-05 RX ORDER — ASPIRIN/CALCIUM CARB/MAGNESIUM 324 MG
300 TABLET ORAL ONCE
Refills: 0 | Status: COMPLETED | OUTPATIENT
Start: 2019-09-05 | End: 2019-09-05

## 2019-09-05 RX ORDER — ATORVASTATIN CALCIUM 80 MG/1
80 TABLET, FILM COATED ORAL AT BEDTIME
Refills: 0 | Status: DISCONTINUED | OUTPATIENT
Start: 2019-09-05 | End: 2019-09-07

## 2019-09-05 RX ADMIN — Medication 300 MILLIGRAM(S): at 01:03

## 2019-09-05 RX ADMIN — CEFTRIAXONE 1 MILLIGRAM(S): 500 INJECTION, POWDER, FOR SOLUTION INTRAMUSCULAR; INTRAVENOUS at 19:28

## 2019-09-05 RX ADMIN — PREGABALIN 1000 MICROGRAM(S): 225 CAPSULE ORAL at 19:28

## 2019-09-05 RX ADMIN — SODIUM CHLORIDE 125 MILLILITER(S): 9 INJECTION INTRAMUSCULAR; INTRAVENOUS; SUBCUTANEOUS at 01:02

## 2019-09-05 RX ADMIN — Medication 25 MILLIGRAM(S): at 19:28

## 2019-09-05 RX ADMIN — CEFTRIAXONE 1000 MILLIGRAM(S): 500 INJECTION, POWDER, FOR SOLUTION INTRAMUSCULAR; INTRAVENOUS at 01:03

## 2019-09-05 RX ADMIN — ATORVASTATIN CALCIUM 80 MILLIGRAM(S): 80 TABLET, FILM COATED ORAL at 21:27

## 2019-09-05 NOTE — H&P ADULT - NSHPLABSRESULTS_GEN_ALL_CORE
CBC Full  -  ( 04 Sep 2019 22:21 )  WBC Count : 5.90 K/uL  RBC Count : 4.08 M/uL  Hemoglobin : 12.2 g/dL  Hematocrit : 38.8 %  Platelet Count - Automated : 176 K/uL  Mean Cell Volume : 95.1 fl  Mean Cell Hemoglobin : 29.9 pg  Mean Cell Hemoglobin Concentration : 31.4 gm/dL  Auto Neutrophil # : 3.94 K/uL  Auto Lymphocyte # : 1.23 K/uL  Auto Monocyte # : 0.56 K/uL  Auto Eosinophil # : 0.10 K/uL  Auto Basophil # : 0.05 K/uL  Auto Neutrophil % : 66.9 %  Auto Lymphocyte % : 20.8 %  Auto Monocyte % : 9.5 %  Auto Eosinophil % : 1.7 %  Auto Basophil % : 0.8 %    PT/INR - ( 04 Sep 2019 22:21 )   PT: 10.6 sec;   INR: 0.96 ratio         PTT - ( 04 Sep 2019 22:21 )  PTT:27.3 sec  Urinalysis Basic - ( 04 Sep 2019 23:35 )    Color: Yellow / Appearance: Clear / S.015 / pH: x  Gluc: x / Ketone: Negative  / Bili: Negative / Urobili: Negative mg/dL   Blood: x / Protein: 15 mg/dL / Nitrite: Negative   Leuk Esterase: Moderate / RBC: 6-10 /HPF / WBC >50   Sq Epi: x / Non Sq Epi: Few / Bacteria: Moderate          136  |  101  |  19  ----------------------------<  89  4.6   |  30  |  0.86    Ca    9.1      04 Sep 2019 22:21    TPro  6.5  /  Alb  3.2<L>  /  TBili  1.3<H>  /  DBili  x   /  AST  26  /  ALT  16  /  AlkPhos  66

## 2019-09-05 NOTE — CONSULT NOTE ADULT - ASSESSMENT
89 year old woman hx of a-fib, not on AC due to fall risk. Presents with weakness Left side. CT of head changes right parietal, poss new CVA.  Previously thought p[oor candidate for long term AC. MR head ordered.

## 2019-09-05 NOTE — SWALLOW BEDSIDE ASSESSMENT ADULT - ASR SWALLOW RECOMMEND DIAG
DEFER MBS AS THE PT APPEARED CLINICALLY TOLERANT OF SUGGESTED PO TEXTURES FROM AN OROPHARYNGEAL SWALLOWING STANCE, DYSPHAGIA WITH A PROPENSITY TO FLUCTUATE IN SEVERITY GIVEN PROFILE AND CONSIDERING HER REDUCED STIMULABILITY FOR EFFECTIVE USE OF COMPENSATORY SWALLOWING MANEUVERS.

## 2019-09-05 NOTE — SWALLOW BEDSIDE ASSESSMENT ADULT - ASR SWALLOW LINGUAL MOBILITY
Unable to assess as pt was unable to follow task directives which appeared to be largely due to reduced hearing acuity.

## 2019-09-05 NOTE — ED ADULT NURSE REASSESSMENT NOTE - NS ED NURSE REASSESS COMMENT FT1
family updated re: plan of care.  all questions answered to satisfaction. bed assigned, will facilitate movement to inpatient bed.

## 2019-09-05 NOTE — H&P ADULT - NSHPPHYSICALEXAM_GEN_ALL_CORE
Physical Exam:   GENERAL APPEARANCE:  NAD, hemodynamically stable, deconditioned, elderly, temporal wasting, not mobile  T(C): 36.7 (09-05-19 @ 11:01), Max: 36.9 (09-05-19 @ 00:37)  HR: 74 (09-05-19 @ 06:16) (67 - 79)  BP: 157/74 (09-05-19 @ 06:16) (142/63 - 174/82)  RR: 15 (09-05-19 @ 06:16) (15 - 18)  SpO2: 98% (09-05-19 @ 06:16) (97% - 98%)  HEENT:  temporal wasting  Skin:  thin / dry / pale  NECK:  NO JVD  HEART:  irregular  LUNGS:  Good ins/exp effort, no W/R/R/C  ABDOMEN:  Soft, nontender, nondistended with good bowel sounds heard  EXTREMITIES:  Without cyanosis, clubbing or edema.   NEUROLOGICAL:  head shakes, poorly follows directions

## 2019-09-05 NOTE — SWALLOW BEDSIDE ASSESSMENT ADULT - SWALLOW EVAL: DIAGNOSIS
1) Feeding integrity is hindered by LUE weakness and a head tremor which is atop Oropharyngeal Dysphagia which subjectively appeared to be a grossly functional condition with a restricted inventory of modified food textures. Overt aspiration signs noted with thin liquids only. Dysphagia is in setting of a CVA as well as underlying muscle wasting.   2)  The pt is alert and interactive but distractible, anxious, impulsive and very [place]. She was able to verbalize during communicative probes. At these times, her speech output was mildly slurred c/w Dysarthria and she had difficulty modulating her vocal intensity due to hearing loss so she spoke at an excessively loud volume. Her underlying utterances were linguistically intact but reflective of variable short term memory/cognitive deficits. 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

## 2019-09-05 NOTE — SWALLOW BEDSIDE ASSESSMENT ADULT - SWALLOW EVAL: RECOMMENDED DIET
SUGGEST A DASH/TLC DYSPHAGIA 2 DIET WITH NECTAR THICK LIQUIDS AS THIS IS THE LEAST RESTRICTIVE TOLERABLE DIET CONSISTENCIES FROM AN OROPHARYNGEAL SWALLOWING STANCE ON CLINICAL EXAM.

## 2019-09-05 NOTE — H&P ADULT - NSICDXPASTMEDICALHX_GEN_ALL_CORE_FT
PAST MEDICAL HISTORY:  Atrial fibrillation, chronic     Carpal tunnel syndrome, right     Hypertension     Hypothyroid     Other iron deficiency anemia

## 2019-09-05 NOTE — H&P ADULT - HISTORY OF PRESENT ILLNESS
Patient is a 89 y/o/f with past medical history of Afib (not on AC), HTN, hypothyroid, other iron deficient anemia admitted with (L) sided weakness confusion, disorientation.  Per pt's daughter, daughter came home around 1:30pm today and found pt feeling weak. Around the same time, pt's home aide noted that pt was drooping in her wheelchair. Daughter states that today, when pt was going onto the toilet, she was having difficulty with her left arm, which is normally extended to pull herself off the toilet. Admission note reviewed thoroughly with patient's  patient's daughter. As per daughter patient now     Patient hard to examine as patient is poorly following commands. Patient has a good hand . Able to shrug the shoulder. raise leg. Patient has abnormal CT scan - suspected fopr acute stroke. Pending MRI and Dr. Lane has been called.

## 2019-09-05 NOTE — H&P ADULT - PROBLEM SELECTOR PLAN 1
- CT scan reviewed - high suspicion for CVA  - STATIN / ASA therapy  - Pending MRI   - Discussed with Neurology -  Dr. Cedeno - CT scan reviewed - high suspicion for CVA  - STATIN / ASA therapy  - Pending MRI as this might be a embolic phenomena and patient will need a AC discussed with the family.   - Discussed with Neurology -  Dr. Cedeno - CT scan reviewed - high suspicion for CVA  - STATIN / ASA therapy  - Pending MRI as this might be a embolic phenomena and patient will need a AC discussed with the family.   - Discussed with Neurology -  Dr. Cedeno  - Jacque discussed with patient's Cardiologist / PCP

## 2019-09-05 NOTE — SWALLOW BEDSIDE ASSESSMENT ADULT - ADDITIONAL RECOMMENDATIONS
1) NEURO F/U    2) NUTRITION F/U    3) TALK LOUDLY TO PATIENT IN FACE TO FACE DYADS AS SHE IS VERY [place].

## 2019-09-05 NOTE — H&P ADULT - NSHPOUTPATIENTPROVIDERS_GEN_ALL_CORE
Dr. Bui (Cardiology) - pending call back  Dr. Mota (PCP) - called Dr. Bui (Cardiology) - pending call back  Dr. Mota (PCP) - called and care discussed

## 2019-09-05 NOTE — SWALLOW BEDSIDE ASSESSMENT ADULT - ORAL PHASE
Bolus formation/transfer were mildly to moderately prolonged and discontinuous but felt to be grossly mechanically functional with the several of the above modified food consistencies. Piecemeal deglutition was evident. Scattered mild tongue debris noted with mechanical soft solids.

## 2019-09-05 NOTE — SWALLOW BEDSIDE ASSESSMENT ADULT - SLP GENERAL OBSERVATIONS
On encounter, a loss of bulk was noted in pt's strap muscle regions. A mild head tremor was apparent. Mild facial asymmetry was noted. The pt is alert and interactive but distractible, anxious, impulsive and very [place]. She was able to verbalize during communicative probes. At these times, her speech output was mildly slurred c/w Dysarthria and she had difficulty modulating her vocal intensity due to hearing loss so she spoke at an excessively loud volume. Her underlying utterances were linguistically intact but reflective of variable short term memory/cognitive deficits. 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

## 2019-09-05 NOTE — SWALLOW BEDSIDE ASSESSMENT ADULT - PHARYNGEAL PHASE
Swallow trigger was timely to mildly latent. laryngeal lift on palpation during swallowing trials was mildly+ reduced but felt to be functional with several of the above modified PO types. Post prandial coughing was demonstrated with thin liquids only despite cues to employ compensatory swallowing maneuvers. NO behavioral aspiration signs demonstrated with nectar thick liquids, pureed foods and mechanical soft solids. Swallow trigger was timely to mildly latent. Laryngeal lift on palpation during swallowing trials was mildly+ reduced but felt to be functional with several of the above modified PO types. Post prandial coughing was demonstrated with thin liquids only despite cues to employ compensatory swallowing maneuvers. NO behavioral aspiration signs demonstrated with nectar thick liquids, pureed foods and mechanical soft solids.

## 2019-09-05 NOTE — SWALLOW BEDSIDE ASSESSMENT ADULT - COMMENTS
The patient was admitted to  with altered mentation, slurred speech and left sided weakness. Imaging of brain notable for evolving right Parietal infarct. This profile is superimposed upon a prior history of A-Fib, HTN, hypothyroidism, iron deficiency anemia, prior right carpal tunnel release, past right cataract extraction and previous left THR.

## 2019-09-05 NOTE — SWALLOW BEDSIDE ASSESSMENT ADULT - ORAL PREPARATORY PHASE
Pt willingly accepted PO. Oral aperture was reduced when accepting PO. Labial grading on utensils was functionally reduced.

## 2019-09-05 NOTE — ED ADULT NURSE REASSESSMENT NOTE - NS ED NURSE REASSESS COMMENT FT1
Pt received from previous RN.  Pt aaox3.  No c/o pain or discomfort, in no apparent distress.  linens clean and dry, breakfast offered to patient.  Plan of care, transition from ED to admitted status discussed with pt.  All questions answered to pt satisfaction.   Call bell given to patient.  No additional needs at this time, will continue hourly rounding.

## 2019-09-06 DIAGNOSIS — I63.411 CEREBRAL INFARCTION DUE TO EMBOLISM OF RIGHT MIDDLE CEREBRAL ARTERY: ICD-10-CM

## 2019-09-06 DIAGNOSIS — I10 ESSENTIAL (PRIMARY) HYPERTENSION: ICD-10-CM

## 2019-09-06 DIAGNOSIS — I63.9 CEREBRAL INFARCTION, UNSPECIFIED: ICD-10-CM

## 2019-09-06 LAB
CHOLEST SERPL-MCNC: 225 MG/DL — HIGH (ref 10–199)
HDLC SERPL-MCNC: 64 MG/DL — SIGNIFICANT CHANGE UP
LIPID PNL WITH DIRECT LDL SERPL: 145 MG/DL — HIGH
TOTAL CHOLESTEROL/HDL RATIO MEASUREMENT: 3.5 RATIO — SIGNIFICANT CHANGE UP (ref 3.3–7.1)
TRIGL SERPL-MCNC: 81 MG/DL — SIGNIFICANT CHANGE UP (ref 10–149)

## 2019-09-06 PROCEDURE — 99233 SBSQ HOSP IP/OBS HIGH 50: CPT

## 2019-09-06 RX ORDER — APIXABAN 2.5 MG/1
2.5 TABLET, FILM COATED ORAL EVERY 12 HOURS
Refills: 0 | Status: DISCONTINUED | OUTPATIENT
Start: 2019-09-06 | End: 2019-09-07

## 2019-09-06 RX ADMIN — APIXABAN 2.5 MILLIGRAM(S): 2.5 TABLET, FILM COATED ORAL at 18:07

## 2019-09-06 RX ADMIN — Medication 25 MICROGRAM(S): at 05:11

## 2019-09-06 RX ADMIN — Medication 25 MILLIGRAM(S): at 05:11

## 2019-09-06 RX ADMIN — ATORVASTATIN CALCIUM 80 MILLIGRAM(S): 80 TABLET, FILM COATED ORAL at 21:11

## 2019-09-06 RX ADMIN — LOSARTAN POTASSIUM 50 MILLIGRAM(S): 100 TABLET, FILM COATED ORAL at 05:11

## 2019-09-06 NOTE — DIETITIAN INITIAL EVALUATION ADULT. - OTHER INFO
89yo female p/w Lt sided weakness with drooping in her wheelchair.  CT shows high suspicion for CVA.  Pt eval'd by SLP on 9/6: rec'd mechanical soft diet with nectar thick liquids.  labs reviewed; high cholesterol level noted; being managed by cardiologist.

## 2019-09-06 NOTE — CHART NOTE - NSCHARTNOTEFT_GEN_A_CORE
Upon Nutritional Assessment by the Registered Dietitian your patient was determined to meet criteria / has evidence of the following diagnosis/diagnoses:          [ ]  Mild Protein Calorie Malnutrition        [ ]  Moderate Protein Calorie Malnutrition        [x] Severe Protein Calorie Malnutrition        [ ] Unspecified Protein Calorie Malnutrition        [x] Underweight / BMI <19        [ ] Morbid Obesity / BMI > 40      Findings:  severe malnutrition in chronic illness r/t decreased ability to consume sufficient energy/protein 2/2 swallowing ability/MS AEB severe muscle/fat wasting; meeting <75% of ENN, and 9% wt loss x 2 mo's    Findings as based on:  •  Comprehensive nutrition assessment and consultation  •  Calorie counts (nutrient intake analysis)  •  Food acceptance and intake status from observations by staff  •  Follow up  •  Patient education  •  Intervention secondary to interdisciplinary rounds  •   concerns      Treatment:    The following diet has been recommended:  1) add gelatein TID and ensure enlive nectar thick once daily   2) add MVI with minerals daily to ensure 100% RDI met   3) consider vitamin check: B12, vitamin D, folate, and thiamine  4) daily wt checks   5) monitor lytes/mins; replete/correct prn    PROVIDER Section:     By signing this assessment you are acknowledging and agree with the diagnosis/diagnoses assigned by the Registered Dietitian    Comments:

## 2019-09-06 NOTE — DIETITIAN INITIAL EVALUATION ADULT. - PHYSICAL APPEARANCE
underweight/other (specify)/emaciated NFPE significant for severe muscle wasting; temporal, clavicle, and shoulder.  severe fat wasting; tricep and orbital.  no edema.  BM (+) 9/5  sarah beth score of 15, no PU noted.

## 2019-09-06 NOTE — DIETITIAN INITIAL EVALUATION ADULT. - PERTINENT LABORATORY DATA
09-05 Na138 mmol/L Glu 99 mg/dL K+ 4.4 mmol/L Cr  0.82 mg/dL BUN 18 mg/dL Phos n/a   Alb n/a   PAB n/a

## 2019-09-06 NOTE — PROGRESS NOTE ADULT - SUBJECTIVE AND OBJECTIVE BOX
PCP:    REQUESTING PHYSICIAN:    REASON FOR CONSULT:    CHIEF COMPLAINT:    HPI:  Patient is a 89 y/o/f with past medical history of Afib (not on AC), HTN, hypothyroid, other iron deficient anemia admitted with (L) sided weakness confusion, disorientation.  Per pt's daughter, daughter came home around 1:30pm today and found pt feeling weak. Around the same time, pt's home aide noted that pt was drooping in her wheelchair. Daughter states that today, when pt was going onto the toilet, she was having difficulty with her left arm, which is normally extended to pull herself off the toilet. Admission note reviewed thoroughly with patient's  patient's daughter.   9/5/19 Cardiology called to evaluate anticoagulation status. Pt declined anticoagulation in the past. She has been followed by her cardiologist for atrial fibrillation. D/W daughter regarding increased risk of recurrent CVA. Pt denies chest pain or shortness of breath.  9/6/19 Pt comfortable. No chest pain. Atrial fibrillation.    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 1 milliGRAM(s) IV Push every 24 hours  levothyroxine 25 MICROGram(s) Oral daily  losartan 50 milliGRAM(s) Oral daily  metoprolol succinate ER 25 milliGRAM(s) Oral daily  sodium chloride 0.9%. 500 milliLiter(s) (125 mL/Hr) IV Continuous <Continuous>    MEDICATIONS  (PRN):        Vital Signs Last 24 Hrs  T(C): 36.7 (06 Sep 2019 08:25), Max: 36.9 (05 Sep 2019 18:06)  T(F): 98.1 (06 Sep 2019 08:25), Max: 98.4 (05 Sep 2019 18:06)  HR: 68 (06 Sep 2019 09:00) (68 - 116)  BP: 138/59 (06 Sep 2019 09:00) (108/49 - 169/91)  BP(mean): 79 (06 Sep 2019 09:00) (63 - 113)  RR: 14 (06 Sep 2019 09:00) (14 - 31)  SpO2: 95% (06 Sep 2019 09:00) (91% - 97%)Daily     Daily I&O's Summary      PHYSICAL EXAM:    Constitutional: NAD, awake and alert, frail  HEENT: PERR, EOMI,  No oral cyananosis.  Neck:  supple,  No JVD  Respiratory: Breath sounds are clear bilaterally, No wheezing, rales or rhonchi  Cardiovascular: S1 and S2, irregular rate and rhythm, no Murmurs, gallops or rubs  Gastrointestinal: Bowel Sounds present, soft, nontender.   Extremities: No peripheral edema. No clubbing or cyanosis.  Vascular: 2+ peripheral pulses  Neurological: A/O x 3, no focal deficits  Musculoskeletal: no calf tenderness.  Skin: No rashes.      LABS: All Labs Reviewed:                        12.2   8.18  )-----------( 182      ( 05 Sep 2019 17:04 )             38.2                         12.2   5.90  )-----------( 176      ( 04 Sep 2019 22:21 )             38.8     05 Sep 2019 17:04    138    |  102    |  18     ----------------------------<  99     4.4     |  30     |  0.82   04 Sep 2019 22:21    136    |  101    |  19     ----------------------------<  89     4.6     |  30     |  0.86     Ca    8.9        05 Sep 2019 17:04  Ca    9.1        04 Sep 2019 22:21    TPro  6.5    /  Alb  3.2    /  TBili  1.3    /  DBili  x      /  AST  26     /  ALT  16     /  AlkPhos  66     04 Sep 2019 22:21    PT/INR - ( 04 Sep 2019 22:21 )   PT: 10.6 sec;   INR: 0.96 ratio         PTT - ( 04 Sep 2019 22:21 )  PTT:27.3 sec  CARDIAC MARKERS ( 05 Sep 2019 04:24 )  <0.015 ng/mL / x     / x     / x     / x      CARDIAC MARKERS ( 04 Sep 2019 22:21 )  <0.015 ng/mL / x     / 54 U/L / x     / x          Blood Culture: Organism --  Gram Stain Blood -- Gram Stain --  Specimen Source .Blood None  Culture-Blood --            RADIOLOGY/EKG:< from: 12 Lead ECG (09.04.19 @ 20:59) >  Diagnosis Line Atrial fibrillation  Nonspecific ST abnormality  Abnormal ECG  When compared with ECG of 20-JUN-2018 01:42,  T wave inversion no longer evident in Lateral leads  Confirmed by OLINDA YE, RONNA ([id number]) on 9/6/2019 5:40:08 AM    < end of copied text >        ECHO/CARDIAC CATHTERIZATION/STRESS TEST:

## 2019-09-06 NOTE — CONSULT NOTE ADULT - PROBLEM SELECTOR RECOMMENDATION 9
Rate controlled. Continue metoprolol. D/W dtr. Advised anticoagulation when cleared by neuro.
f/u brain MR  on asa, statin  ? candidate for long term AC.  supportive care

## 2019-09-06 NOTE — DIETITIAN INITIAL EVALUATION ADULT. - ADD RECOMMEND
1) add gelatein TID and ensure enlive nectar thick once daily 2) add MVI with minerals daily to ensure 100% RDI met 3) consider vitamin check: B12, vitamin D, folate, and thiamine 4) daily wt checks 5) monitor lytes/mins; replete/correct prn

## 2019-09-06 NOTE — DIETITIAN INITIAL EVALUATION ADULT. - PROBLEM SELECTOR PLAN 1
- CT scan reviewed - high suspicion for CVA  - STATIN / ASA therapy  - Pending MRI as this might be a embolic phenomena and patient will need a AC discussed with the family.   - Discussed with Neurology -  Dr. Cedeno  - Jacque discussed with patient's Cardiologist / PCP

## 2019-09-06 NOTE — DIETITIAN INITIAL EVALUATION ADULT. - MALNUTRITION
severe malnutrition in chronic illness r/t decreased ability to consume sufficient energy/protein 2/2 swallowing ability/MS AEB severe muscle/fat wasting; meeting <75% of ENN, and 9% wt loss x 2 mo's severe malnutrition in chronic illness

## 2019-09-06 NOTE — CONSULT NOTE ADULT - SUBJECTIVE AND OBJECTIVE BOX
PCP:    REQUESTING PHYSICIAN:    REASON FOR CONSULT:    CHIEF COMPLAINT:    HPI:  Patient is a 89 y/o/f with past medical history of Afib (not on AC), HTN, hypothyroid, other iron deficient anemia admitted with (L) sided weakness confusion, disorientation.  Per pt's daughter, daughter came home around 1:30pm today and found pt feeling weak. Around the same time, pt's home aide noted that pt was drooping in her wheelchair. Daughter states that today, when pt was going onto the toilet, she was having difficulty with her left arm, which is normally extended to pull herself off the toilet. Admission note reviewed thoroughly with patient's  patient's daughter.   9/5/19 Cardiology called to evaluate anticoagulation status. Pt declined anticoagulation in the past. She has been followed by her cardiologist for atrial fibrillation. D/W daughter regarding increased risk of recurrent CVA. Pt denies chest pain or shortness of breath.      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 1 milliGRAM(s) IV Push every 24 hours  levothyroxine 25 MICROGram(s) Oral daily  losartan 50 milliGRAM(s) Oral daily  metoprolol succinate ER 25 milliGRAM(s) Oral daily  sodium chloride 0.9%. 500 milliLiter(s) (125 mL/Hr) IV Continuous <Continuous>    MEDICATIONS  (PRN):      REVIEW OF SYSTEMS:    CONSTITUTIONAL: No weakness, fevers or chills  EYES/ENT: No visual changes;  No vertigo or throat pain   NECK: No pain or stiffness  RESPIRATORY: No cough, wheezing, hemoptysis; No shortness of breath  CARDIOVASCULAR: No chest pain or palpitations  GASTROINTESTINAL: No abdominal or epigastric pain. No nausea, vomiting, or hematemesis; No diarrhea or constipation. No melena or hematochezia.  GENITOURINARY: No dysuria, frequency or hematuria  NEUROLOGICAL: Left sided weakness  SKIN: No itching, burning, rashes, or lesions   All other review of systems is negative unless indicated above    Vital Signs Last 24 Hrs  T(C): 36.7 (06 Sep 2019 08:25), Max: 36.9 (05 Sep 2019 18:06)  T(F): 98.1 (06 Sep 2019 08:25), Max: 98.4 (05 Sep 2019 18:06)  HR: 72 (06 Sep 2019 06:00) (71 - 116)  BP: 131/59 (06 Sep 2019 06:00) (108/49 - 169/91)  BP(mean): 76 (06 Sep 2019 06:00) (63 - 113)  RR: 21 (06 Sep 2019 06:00) (16 - 31)  SpO2: 95% (06 Sep 2019 06:00) (91% - 97%)    I&O's Summary      PHYSICAL EXAM:    Constitutional: NAD, awake and alert, well-developed  HEENT: PERR, EOMI,  No oral cyananosis.  Neck:  supple,  No JVD  Respiratory: Breath sounds are clear bilaterally, No wheezing, rales or rhonchi  Cardiovascular: S1 and S2, regular rate and rhythm, no Murmurs, gallops or rubs  Gastrointestinal: Bowel Sounds present, soft, nontender.   Extremities: No peripheral edema. No clubbing or cyanosis.  Vascular: 2+ peripheral pulses  Neurological: Left sided weakness  Musculoskeletal: no calf tenderness.  Skin: No rashes.      LABS: All Labs Reviewed:                        12.2   8.18  )-----------( 182      ( 05 Sep 2019 17:04 )             38.2                         12.2   5.90  )-----------( 176      ( 04 Sep 2019 22:21 )             38.8     05 Sep 2019 17:04    138    |  102    |  18     ----------------------------<  99     4.4     |  30     |  0.82   04 Sep 2019 22:21    136    |  101    |  19     ----------------------------<  89     4.6     |  30     |  0.86     Ca    8.9        05 Sep 2019 17:04  Ca    9.1        04 Sep 2019 22:21    TPro  6.5    /  Alb  3.2    /  TBili  1.3    /  DBili  x      /  AST  26     /  ALT  16     /  AlkPhos  66     04 Sep 2019 22:21    PT/INR - ( 04 Sep 2019 22:21 )   PT: 10.6 sec;   INR: 0.96 ratio         PTT - ( 04 Sep 2019 22:21 )  PTT:27.3 sec  CARDIAC MARKERS ( 05 Sep 2019 04:24 )  <0.015 ng/mL / x     / x     / x     / x      CARDIAC MARKERS ( 04 Sep 2019 22:21 )  <0.015 ng/mL / x     / 54 U/L / x     / x          Blood Culture:         RADIOLOGY/EKG:< from: 12 Lead ECG (09.04.19 @ 20:59) >  Diagnosis Line Atrial fibrillation  Nonspecific ST abnormality  Abnormal ECG  When compared with ECG of 20-JUN-2018 01:42,  T wave inversion no longer evident in Lateral leads  Confirmed by OLINDA YE, RONNA ([id number]) on 9/6/2019 5:40:08 AM    < end of copied text >
89 y/o/f with past medical history of Afib (not on AC), HTN, hypothyroid, admitted with (L) sided weakness, confusion, disorientation. Patient unable to give a hx, as per chart, family,   pt's daughter,  she was having difficulty with her left arm.     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 1 milliGRAM(s) IV Push every 24 hours  cyanocobalamin 1000 MICROGram(s) Oral once  levothyroxine 25 MICROGram(s) Oral daily  losartan 50 milliGRAM(s) Oral daily  metoprolol succinate ER 25 milliGRAM(s) Oral daily  sodium chloride 0.9%. 500 milliLiter(s) (125 mL/Hr) IV Continuous <Continuous>     ROS: Pertinent positives in HPI, all other ROS were reviewed and are negative.      Examination:   Vital Signs Last 24 Hrs  T(C): 36.7 (05 Sep 2019 11:01), Max: 36.9 (05 Sep 2019 00:37)  T(F): 98 (05 Sep 2019 11:01), Max: 98.5 (05 Sep 2019 06:16)  HR: 74 (05 Sep 2019 06:16) (67 - 79)  BP: 157/74 (05 Sep 2019 06:16) (142/63 - 174/82)  BP(mean): --  RR: 15 (05 Sep 2019 06:16) (15 - 18)  SpO2: 98% (05 Sep 2019 06:16) (97% - 98%)  General: min cooperative, NAD   NECK: supple, no masses  ENT: Normal hearing   Vascular : no carotid bruits,   Lungs: CTAB  Chest: RRR, no murmurs  Extremities: nontender, no edema  Musculoskeletal: no adventitious movements, no joint stiffness  Skin: no rash    Neurological Examination:  NIHSS:  MS: Alert, dysarthric, follows 1 step commands   CN: blinks to threat, PERLL, EOMI, V1-3 sensation intact, face asymmetric, LNLFD, hearing redcued, palate elevates symmetrically, tongue midline, SCM equal bilaterally  Motor: diff to assess tone, LUE hand held fisted, Strength ~3/5, LLE ~3-4/5, RUE ~4/5, RLE ~3-4/5   Sens: grossly intact to light touch.    Reflexes: 1-2/4 all over, downgoing toes b/l  Coord:  limited on left  Gait: Cannot test    Labs: Reviewed  Comprehensive Metabolic Panel (09.04.19 @ 22:21)    Sodium, Serum: 136 mmol/L    Potassium, Serum: 4.6 mmol/L    Chloride, Serum: 101 mmol/L    Carbon Dioxide, Serum: 30 mmol/L    Anion Gap, Serum: 5 mmol/L    Blood Urea Nitrogen, Serum: 19 mg/dL    Creatinine, Serum: 0.86 mg/dL    Glucose, Serum: 89 mg/dL    Calcium, Total Serum: 9.1 mg/dL    Protein Total, Serum: 6.5 gm/dL    Albumin, Serum: 3.2 g/dL    Bilirubin Total, Serum: 1.3 mg/dL    Alkaline Phosphatase, Serum: 66 U/L    Aspartate Aminotransferase (AST/SGOT): 26 U/L    Alanine Aminotransferase (ALT/SGPT): 16 U/L    eGFR if Non : 57        Imaging:   < from: CT Head No Cont (09.04.19 @ 23:59) >  COMPARISON: 5/28/2018.    FINDINGS: Patient motion degrades images, especially in the lower brain   and posterior fossa. There is no obvious acute intracranial hemorrhage,   mass effect or midline shift, given the extent of artifact. Nonspecific   moderate periventricular and subcortical white matter lucencies may   represent chronic microvascular ischemic changes or age-indeterminate   white matter infarcts. Decreased white matter differentiation in the   right parietal region appears new since prior study and raise suspicion   for evolving acute infarct. Again noted is right frontal   encephalomalacia. There is mild cerebral volume loss with commensurate   ventricular dilatation.     There isno depressed skull fracture. There is minimal mucosal thickening   of the sinuses. The tympanomastoid region is clear. There is no   significant interval change.     IMPRESSION:     No acute intracranial hemorrhage. Chronic microvascular ischemic changes   with right frontal encephalomalacia, noted on 5/28/2018. Suspect evolving   acute right parietal infarct. MRI would be helpful for further evaluation.

## 2019-09-06 NOTE — DIETITIAN INITIAL EVALUATION ADULT. - FACTORS AFF FOOD INTAKE
daughter at bedside provided information; pt eating brunch at 1pm with sandwich, hot chocolate, apple cider, and cookie.  then dinner at 7pm: of soup (cream).  pt meeting <75% of ENN chronically./other (specify)

## 2019-09-06 NOTE — DIETITIAN INITIAL EVALUATION ADULT. - PERTINENT MEDS FT
MEDICATIONS  (STANDING):  aspirin 325 milliGRAM(s) Oral daily  atorvastatin 80 milliGRAM(s) Oral at bedtime  cefTRIAXone Injectable. 1 milliGRAM(s) IV Push every 24 hours  levothyroxine 25 MICROGram(s) Oral daily  losartan 50 milliGRAM(s) Oral daily  metoprolol succinate ER 25 milliGRAM(s) Oral daily  sodium chloride 0.9%. 500 milliLiter(s) (125 mL/Hr) IV Continuous <Continuous>

## 2019-09-06 NOTE — PROGRESS NOTE ADULT - SUBJECTIVE AND OBJECTIVE BOX
Reason for Admission: (L) sided weakness	  History of Present Illness: 	    Patient is a 89 y/o/f with past medical history of Afib (not on AC), HTN, hypothyroid, other iron deficient anemia admitted with (L) sided weakness confusion, disorientation.  Per pt's daughter, daughter came home around 1:30pm today and found pt feeling weak. Around the same time, pt's home aide noted that pt was drooping in her wheelchair. Daughter states that today, when pt was going onto the toilet, she was having difficulty with her left arm, which is normally extended to pull herself off the toilet. Admission note reviewed thoroughly with patient's  patient's daughter. As per daughter patient now     Patient hard to examine as patient is poorly following commands. Patient has a good hand . Able to shrug the shoulder. raise leg. Patient has abnormal CT scan - suspected fopr acute stroke. Pending MRI and Dr. Lane has been called.     REVIEW OF SYSTEMS:  General: NAD, hemodynamically stable   HEENT:  Eyes:  No visual loss, blurred vision, double vision or yellow sclerae. Ears, Nose, Throat:  No hearing loss, sneezing, congestion, runny nose or sore throat.  SKIN:  No rash or itching.  CARDIOVASCULAR:  No chest pain, chest pressure or chest discomfort. No palpitations or edema.  RESPIRATORY:  No shortness of breath, cough or sputum.  GASTROINTESTINAL:  No anorexia, nausea, vomiting or diarrhea. No abdominal pain or blood.  NEUROLOGICAL:  No headache, dizziness, syncope, paralysis, ataxia, numbness or tingling in the extremities. No change in bowel or bladder control.  MUSCULOSKELETAL:  No muscle, back pain, joint pain or stiffness.  HEMATOLOGIC:  No anemia, bleeding or bruising.  LYMPHATICS:  No enlarged nodes. No history of splenectomy.  ENDOCRINOLOGIC:  No reports of sweating, cold or heat intolerance. No polyuria or polydipsia.  ALLERGIES:  No history of asthma, hives, eczema or rhinitis.    Physical Exam:   GENERAL APPEARANCE:  NAD, hemodynamically stable  T(C): 36.7 (19 @ 08:25), Max: 36.9 (19 @ 18:06)  HR: 68 (19 @ 09:00) (68 - 116)  BP: 138/59 (09-06-19 @ 09:00) (108/49 - 169/91)  RR: 14 (19 @ 09:00) (14 - 31)  SpO2: 95% (19 @ 09:00) (91% - 97%)  Wt(kg): --  HEENT:  Head is normocephalic    Skin:  Warm and dry without any rash   NECK:  Supple without lymphadenopathy.   HEART:  Regular rate and rhythm. normal S1 and S2, No M/R/G  LUNGS:  Good ins/exp effort, no W/R/R/C  ABDOMEN:  Soft, nontender, nondistended with good bowel sounds heard  EXTREMITIES:  Without cyanosis, clubbing or edema.   NEUROLOGICAL:  Gross nonfocal       CBC Full  -  ( 05 Sep 2019 17:04 )  WBC Count : 8.18 K/uL  RBC Count : 4.04 M/uL  Hemoglobin : 12.2 g/dL  Hematocrit : 38.2 %  Platelet Count - Automated : 182 K/uL  Mean Cell Volume : 94.6 fl  Mean Cell Hemoglobin : 30.2 pg  Mean Cell Hemoglobin Concentration : 31.9 gm/dL  Auto Neutrophil # : 6.97 K/uL  Auto Lymphocyte # : 0.68 K/uL  Auto Monocyte # : 0.44 K/uL  Auto Eosinophil # : 0.01 K/uL  Auto Basophil # : 0.04 K/uL  Auto Neutrophil % : 85.2 %  Auto Lymphocyte % : 8.3 %  Auto Monocyte % : 5.4 %  Auto Eosinophil % : 0.1 %  Auto Basophil % : 0.5 %    PT/INR - ( 04 Sep 2019 22:21 )   PT: 10.6 sec;   INR: 0.96 ratio         PTT - ( 04 Sep 2019 22:21 )  PTT:27.3 sec  Urinalysis Basic - ( 04 Sep 2019 23:35 )    Color: Yellow / Appearance: Clear / S.015 / pH: x  Gluc: x / Ketone: Negative  / Bili: Negative / Urobili: Negative mg/dL   Blood: x / Protein: 15 mg/dL / Nitrite: Negative   Leuk Esterase: Moderate / RBC: 6-10 /HPF / WBC >50   Sq Epi: x / Non Sq Epi: Few / Bacteria: Moderate          138  |  102  |  18  ----------------------------<  99  4.4   |  30  |  0.82    Ca    8.9      05 Sep 2019 17:04    TPro  6.5  /  Alb  3.2<L>  /  TBili  1.3<H>  /  DBili  x   /  AST  26  /  ALT  16  /  AlkPhos  66  09-04      # Arm weakness  - CT scan reviewed - high suspicion for CVA  - STATIN / ASA   - Eliquis 2.5 mg po BID  - was not able to tolerate MRI  - Discussed with Neurology -  Dr. Cedeno  - Care discussed with patient's Cardiologist      # Dysphagia, unspecified type  - speech and swallow eval.     # Atrial fibrillation, chronic  - started on eliquis  - pending Dr. Bui call back - message left with the .     # Other iron deficiency anemia  - monitor.     # Hypothyroid  -  continue synthroid.     # Hypertension  - continue with home meds. Reason for Admission: (L) sided weakness	  History of Present Illness: 	    Patient is a 89 y/o/f with past medical history of Afib (not on AC), HTN, hypothyroid, other iron deficient anemia admitted with (L) sided weakness confusion, disorientation.  Per pt's daughter, daughter came home around 1:30pm today and found pt feeling weak. Around the same time, pt's home aide noted that pt was drooping in her wheelchair. Daughter states that today, when pt was going onto the toilet, she was having difficulty with her left arm, which is normally extended to pull herself off the toilet. Admission note reviewed thoroughly with patient's  patient's daughter. As per daughter patient now     Patient hard to examine as patient is poorly following commands. Patient has a good hand . Able to shrug the shoulder. raise leg. Patient has abnormal CT scan - suspected fopr acute stroke. Pending MRI and Dr. Lane has been called and care discussed. D/C planning in the am     REVIEW OF SYSTEMS:  General: NAD, hemodynamically stable   HEENT:  Eyes:  No visual loss, blurred vision, double vision or yellow sclerae. Ears, Nose, Throat:  No hearing loss, sneezing, congestion, runny nose or sore throat.  SKIN:  No rash or itching.  CARDIOVASCULAR:  No chest pain, chest pressure or chest discomfort. No palpitations or edema.  RESPIRATORY:  No shortness of breath, cough or sputum.  GASTROINTESTINAL:  No anorexia, nausea, vomiting or diarrhea. No abdominal pain or blood.  NEUROLOGICAL:  No headache, dizziness, syncope, paralysis, ataxia, numbness or tingling in the extremities. No change in bowel or bladder control.  MUSCULOSKELETAL:  No muscle, back pain, joint pain or stiffness.  HEMATOLOGIC:  No anemia, bleeding or bruising.  LYMPHATICS:  No enlarged nodes. No history of splenectomy.  ENDOCRINOLOGIC:  No reports of sweating, cold or heat intolerance. No polyuria or polydipsia.  ALLERGIES:  No history of asthma, hives, eczema or rhinitis.    Physical Exam:   GENERAL APPEARANCE:  NAD, hemodynamically stable  T(C): 36.7 (19 @ 08:25), Max: 36.9 (19 @ 18:06)  HR: 68 (19 @ 09:00) (68 - 116)  BP: 138/59 (19 @ 09:00) (108/49 - 169/91)  RR: 14 (19 @ 09:00) (14 - 31)  SpO2: 95% (19 @ 09:00) (91% - 97%)  Wt(kg): --  HEENT:  Head is normocephalic    Skin:  Warm and dry without any rash   NECK:  Supple without lymphadenopathy.   HEART:  Regular rate and rhythm. normal S1 and S2, No M/R/G  LUNGS:  Good ins/exp effort, no W/R/R/C  ABDOMEN:  Soft, nontender, nondistended with good bowel sounds heard  EXTREMITIES:  Without cyanosis, clubbing or edema.   NEUROLOGICAL:  Gross nonfocal       CBC Full  -  ( 05 Sep 2019 17:04 )  WBC Count : 8.18 K/uL  RBC Count : 4.04 M/uL  Hemoglobin : 12.2 g/dL  Hematocrit : 38.2 %  Platelet Count - Automated : 182 K/uL  Mean Cell Volume : 94.6 fl  Mean Cell Hemoglobin : 30.2 pg  Mean Cell Hemoglobin Concentration : 31.9 gm/dL  Auto Neutrophil # : 6.97 K/uL  Auto Lymphocyte # : 0.68 K/uL  Auto Monocyte # : 0.44 K/uL  Auto Eosinophil # : 0.01 K/uL  Auto Basophil # : 0.04 K/uL  Auto Neutrophil % : 85.2 %  Auto Lymphocyte % : 8.3 %  Auto Monocyte % : 5.4 %  Auto Eosinophil % : 0.1 %  Auto Basophil % : 0.5 %    PT/INR - ( 04 Sep 2019 22:21 )   PT: 10.6 sec;   INR: 0.96 ratio         PTT - ( 04 Sep 2019 22:21 )  PTT:27.3 sec  Urinalysis Basic - ( 04 Sep 2019 23:35 )    Color: Yellow / Appearance: Clear / S.015 / pH: x  Gluc: x / Ketone: Negative  / Bili: Negative / Urobili: Negative mg/dL   Blood: x / Protein: 15 mg/dL / Nitrite: Negative   Leuk Esterase: Moderate / RBC: 6-10 /HPF / WBC >50   Sq Epi: x / Non Sq Epi: Few / Bacteria: Moderate          138  |  102  |  18  ----------------------------<  99  4.4   |  30  |  0.82    Ca    8.9      05 Sep 2019 17:04    TPro  6.5  /  Alb  3.2<L>  /  TBili  1.3<H>  /  DBili  x   /  AST  26  /  ALT  16  /  AlkPhos  66  09-04      # Arm weakness  - CT scan reviewed - high suspicion for CVA  - STATIN / ASA   - Eliquis 2.5 mg po BID  - was not able to tolerate MRI  - Discussed with Neurology -  Dr. Cedeno  - Care discussed with patient's Cardiologist      # Dysphagia, unspecified type  - speech and swallow eval.     # Atrial fibrillation, chronic  - started on eliquis  - pending Dr. Bui call back - message left with the .     # Other iron deficiency anemia  - monitor.     # Hypothyroid  -  continue synthroid.     # Hypertension  - continue with home meds.

## 2019-09-07 ENCOUNTER — TRANSCRIPTION ENCOUNTER (OUTPATIENT)
Age: 84
End: 2019-09-07

## 2019-09-07 VITALS — TEMPERATURE: 98 F

## 2019-09-07 PROCEDURE — 99233 SBSQ HOSP IP/OBS HIGH 50: CPT

## 2019-09-07 RX ORDER — ACETAMINOPHEN 500 MG
1 TABLET ORAL
Qty: 0 | Refills: 0 | DISCHARGE

## 2019-09-07 RX ORDER — ASPIRIN/CALCIUM CARB/MAGNESIUM 324 MG
1 TABLET ORAL
Qty: 0 | Refills: 0 | DISCHARGE

## 2019-09-07 RX ORDER — APIXABAN 2.5 MG/1
1 TABLET, FILM COATED ORAL
Qty: 30 | Refills: 0
Start: 2019-09-07

## 2019-09-07 RX ORDER — ATORVASTATIN CALCIUM 80 MG/1
1 TABLET, FILM COATED ORAL
Qty: 0 | Refills: 0 | DISCHARGE
Start: 2019-09-07

## 2019-09-07 RX ADMIN — LOSARTAN POTASSIUM 50 MILLIGRAM(S): 100 TABLET, FILM COATED ORAL at 06:44

## 2019-09-07 RX ADMIN — Medication 25 MICROGRAM(S): at 06:44

## 2019-09-07 RX ADMIN — Medication 25 MILLIGRAM(S): at 06:44

## 2019-09-07 RX ADMIN — APIXABAN 2.5 MILLIGRAM(S): 2.5 TABLET, FILM COATED ORAL at 06:44

## 2019-09-07 NOTE — PROGRESS NOTE ADULT - REASON FOR ADMISSION
Multiple attempts to draw labs. Unsuccessful. Huc aware to page lab.   
(L) sided weakness

## 2019-09-07 NOTE — PROGRESS NOTE ADULT - SUBJECTIVE AND OBJECTIVE BOX
PCP:    REQUESTING PHYSICIAN:    REASON FOR CONSULT:    CHIEF COMPLAINT:    HPI:  Patient is a 89 y/o/f with past medical history of Afib (not on AC), HTN, hypothyroid, other iron deficient anemia admitted with (L) sided weakness confusion, disorientation.  Per pt's daughter, daughter came home around 1:30pm today and found pt feeling weak. Around the same time, pt's home aide noted that pt was drooping in her wheelchair. Daughter states that today, when pt was going onto the toilet, she was having difficulty with her left arm, which is normally extended to pull herself off the toilet. Admission note reviewed thoroughly with patient's  patient's daughter.   19 Cardiology called to evaluate anticoagulation status. Pt declined anticoagulation in the past. She has been followed by her cardiologist for atrial fibrillation. D/W daughter regarding increased risk of recurrent CVA. Pt denies chest pain or shortness of breath.  19 Pt comfortable. No chest pain. Atrial fibrillation.  19 Pt comfortable. No chest pain. Atrial fibrillation rate controlled.    PAST MEDICAL & SURGICAL HISTORY:  Atrial fibrillation, chronic  Other iron deficiency anemia  Carpal tunnel syndrome, right  Hypothyroid  Hypertension  S/P cataract surgery, right  S/P hip replacement, left: 2009      SOCIAL HISTORY:    FAMILY HISTORY:  No pertinent family history in first degree relatives      ALLERGIES:  Allergies    No Known Allergies    Intolerances        MEDICATIONS:    MEDICATIONS  (STANDING):  apixaban 2.5 milliGRAM(s) Oral every 12 hours  atorvastatin 80 milliGRAM(s) Oral at bedtime  levothyroxine 25 MICROGram(s) Oral daily  losartan 50 milliGRAM(s) Oral daily  metoprolol succinate ER 25 milliGRAM(s) Oral daily    MEDICATIONS  (PRN):        Vital Signs Last 24 Hrs  T(C): 36.6 (07 Sep 2019 09:43), Max: 36.9 (06 Sep 2019 21:09)  T(F): 97.9 (07 Sep 2019 09:43), Max: 98.4 (06 Sep 2019 21:09)  HR: 77 (07 Sep 2019 09:00) (71 - 92)  BP: 155/117 (07 Sep 2019 08:00) (106/58 - 155/117)  BP(mean): 127 (07 Sep 2019 08:00) (65 - 127)  RR: 21 (07 Sep 2019 09:00) (17 - 28)  SpO2: 96% (07 Sep 2019 09:00) (91% - 96%)Daily     Daily Weight in k.9 (07 Sep 2019 06:55)I&O's Summary    06 Sep 2019 07:01  -  07 Sep 2019 07:00  --------------------------------------------------------  IN: 240 mL / OUT: 500 mL / NET: -260 mL        PHYSICAL EXAM:    Constitutional: NAD, awake and alert, well-developed  HEENT: PERR, EOMI,  No oral cyananosis.  Neck:  supple,  No JVD  Respiratory: Breath sounds are clear bilaterally, No wheezing, rales or rhonchi  Cardiovascular: S1 and S2, regular rate and rhythm, no Murmurs, gallops or rubs  Gastrointestinal: Bowel Sounds present, soft, nontender.   Extremities: No peripheral edema. No clubbing or cyanosis.  Vascular: 2+ peripheral pulses  Neurological: A/O x 3, no focal deficits  Musculoskeletal: no calf tenderness.  Skin: No rashes.      LABS: All Labs Reviewed:                        12.2   8.18  )-----------( 182      ( 05 Sep 2019 17:04 )             38.2                         12.2   5.90  )-----------( 176      ( 04 Sep 2019 22:21 )             38.8     05 Sep 2019 17:04    138    |  102    |  18     ----------------------------<  99     4.4     |  30     |  0.82   04 Sep 2019 22:21    136    |  101    |  19     ----------------------------<  89     4.6     |  30     |  0.86     Ca    8.9        05 Sep 2019 17:04  Ca    9.1        04 Sep 2019 22:21    TPro  6.5    /  Alb  3.2    /  TBili  1.3    /  DBili  x      /  AST  26     /  ALT  16     /  AlkPhos  66     04 Sep 2019 22:21          Blood Culture: Organism --  Gram Stain Blood -- Gram Stain --  Specimen Source .Blood None  Culture-Blood --            RADIOLOGY/EKG:< from: 12 Lead ECG (19 @ 20:59) >  Diagnosis Line Atrial fibrillation  Nonspecific ST abnormality  Abnormal ECG  When compared with ECG of 2018 01:42,  T wave inversion no longer evident in Lateral leads  Confirmed by OLINDA YE, RONNA ([id number]) on 2019 5:40:08 AM    < end of copied text >        ECHO/CARDIAC CATHTERIZATION/STRESS TEST:

## 2019-09-07 NOTE — DISCHARGE NOTE PROVIDER - PROVIDER TOKENS
PROVIDER:[TOKEN:[[id number]]],PROVIDER:[TOKEN:[[id number]]] PROVIDER:[TOKEN:[[id number]]],FREE:[LAST:[Josué],FIRST:[Levi],PHONE:[(   )    -],FAX:[(   )    -],ADDRESS:[[address] [place] [address]]

## 2019-09-07 NOTE — DISCHARGE NOTE PROVIDER - NSDCCPCAREPLAN_GEN_ALL_CORE_FT
PRINCIPAL DISCHARGE DIAGNOSIS  Diagnosis: Arm weakness  Assessment and Plan of Treatment: # CVA  # Arm weakness  - CT scan reviewed - high suspicion for CVA  - STATIN / ASA   - Eliquis 2.5 mg po BID  - was not able to tolerate MRI  - Neurology evaluation by Dr. Cedeno -  suggestive of embolix stroke from atrial fibrillation  - Care discussed with patient's Cardiologist  - recommended blood thinner eliquis  - please monitor for any bleeding or change of mental status as you have now been started on medication Eliquis.   - Please follow up with Dr. Moses (Primary Care Physician)  - PLease follow up with Dr. Bui (Cardiology)        SECONDARY DISCHARGE DIAGNOSES  Diagnosis: Atrial fibrillation  Assessment and Plan of Treatment: # Atrial fibrillation, chronic  - started on eliquis  - pending Dr. Bui call back - message left with the .

## 2019-09-07 NOTE — DISCHARGE NOTE PROVIDER - CARE PROVIDER_API CALL
Shar Bui)  [facility] Cardiology  [address], [address]  [place] [address]  Phone: [phone]  Fax: [phone]  Follow Up Time:     Razia Mota (DO)  Family Medicine  [facility] [place] [address]  Phone: [phone]  Fax: [phone]  Follow Up Time: Shar Bui)  [facility] Cardiology  [address], [address]  [place] [address]  Phone: [phone]  Fax: [phone]  Follow Up Time:     Levi Moses  [address] [place] [address]  Phone: (   )    -  Fax: (   )    -  Follow Up Time:

## 2019-09-07 NOTE — DISCHARGE NOTE PROVIDER - NSDCFUADDINST_GEN_ALL_CORE_FT
If you notice any change of mental state or bleeding please call 911 or come to the hospital immediately

## 2019-09-07 NOTE — DISCHARGE NOTE NURSING/CASE MANAGEMENT/SOCIAL WORK - NSDCPEPTSTRK_GEN_ALL_CORE
Call 911 for stroke/Prescribed medications/Need for follow up after discharge/Stroke education booklet/Risk factors for stroke/Stroke support groups for patients, families, and friends/Stroke warning signs and symptoms/Signs and symptoms of stroke

## 2019-09-07 NOTE — DISCHARGE NOTE PROVIDER - CARE PROVIDERS DIRECT ADDRESSES
,[email or web address]

## 2019-09-07 NOTE — DISCHARGE NOTE NURSING/CASE MANAGEMENT/SOCIAL WORK - PATIENT PORTAL LINK FT
You can access the FollowMyHealth Patient Portal offered by [facility] by registering at the following website: [email or web address]. By joining [employer]’s FollowMyHealth portal, you will also be able to view your health information using other applications (apps) compatible with our system.

## 2019-09-07 NOTE — PROGRESS NOTE ADULT - PROBLEM SELECTOR PLAN 1
Rate controlled. Continue current therapy.
Rate controlled. Continue current therapy. Continue Eliquis 2.5 bid

## 2019-09-07 NOTE — DISCHARGE NOTE PROVIDER - HOSPITAL COURSE
Patient is a 89 y/o/f with past medical history of Afib (not on AC), HTN, hypothyroid, other iron deficient anemia admitted with (L) sided weakness confusion, disorientation.  Per pt's daughter, daughter came home around 1:30pm today and found pt feeling weak. Around the same time, pt's home aide noted that pt was drooping in her wheelchair. Daughter states that today, when pt was going onto the toilet, she was having difficulty with her left arm, which is normally extended to pull herself off the toilet. Admission note reviewed thoroughly with patient's  patient's daughter. As per daughter patient now         Care discussed with patient's daughter /  cardiologist and neurologist. Imaging studies were suggestive of Stroke. In a setting of atrial fibrillation (not on AC) and stroke -  medical team decided to start the patient on eliquis. Patient's family fully explained with risks and benefits of the eliques. Decision was made for treatment of atrial fibrillation with blood thinner.         Physical Exam:     GENERAL APPEARANCE:  NAD, hemodynamically stable, deconditioned, chronic    T(C): 36.6 (09-07-19 @ 09:43), Max: 36.9 (09-06-19 @ 21:09)    HR: 77 (09-07-19 @ 09:00) (71 - 92)    BP: 155/117 (09-07-19 @ 08:00) (106/58 - 155/117)    RR: 21 (09-07-19 @ 09:00) (17 - 28)    SpO2: 96% (09-07-19 @ 09:00) (91% - 96%)    HEENT:  Head is normocephalic      Skin:  Warm and dry without any rash     NECK:  Supple without lymphadenopathy.     HEART:  Regular rate and rhythm. normal S1 and S2, No M/R/G    LUNGS:  Good ins/exp effort, no W/R/R/C    ABDOMEN:  Soft, nontender, nondistended with good bowel sounds heard    EXTREMITIES:  Without cyanosis, clubbing or edema.     NEUROLOGICAL:  Gross nonfocal         Labs:     CBC Full  -  ( 05 Sep 2019 17:04 )    WBC Count : 8.18 K/uL    RBC Count : 4.04 M/uL    Hemoglobin : 12.2 g/dL    Hematocrit : 38.2 %    Platelet Count - Automated : 182 K/uL        138  |  102  |  18    ----------------------------<  99    4.4   |  30  |  0.82        Ca    8.9      05 Sep 2019 17:04

## 2019-09-10 LAB
CULTURE RESULTS: SIGNIFICANT CHANGE UP
CULTURE RESULTS: SIGNIFICANT CHANGE UP
SPECIMEN SOURCE: SIGNIFICANT CHANGE UP
SPECIMEN SOURCE: SIGNIFICANT CHANGE UP

## 2019-09-11 DIAGNOSIS — G81.94 HEMIPLEGIA, UNSPECIFIED AFFECTING LEFT NONDOMINANT SIDE: ICD-10-CM

## 2019-09-11 DIAGNOSIS — I63.411 CEREBRAL INFARCTION DUE TO EMBOLISM OF RIGHT MIDDLE CEREBRAL ARTERY: ICD-10-CM

## 2019-09-11 DIAGNOSIS — E03.9 HYPOTHYROIDISM, UNSPECIFIED: ICD-10-CM

## 2019-09-11 DIAGNOSIS — I48.2 CHRONIC ATRIAL FIBRILLATION: ICD-10-CM

## 2019-09-11 DIAGNOSIS — I10 ESSENTIAL (PRIMARY) HYPERTENSION: ICD-10-CM

## 2019-09-11 DIAGNOSIS — E43 UNSPECIFIED SEVERE PROTEIN-CALORIE MALNUTRITION: ICD-10-CM

## 2019-09-11 DIAGNOSIS — I48.91 UNSPECIFIED ATRIAL FIBRILLATION: ICD-10-CM

## 2019-09-11 DIAGNOSIS — R13.10 DYSPHAGIA, UNSPECIFIED: ICD-10-CM

## 2019-09-11 DIAGNOSIS — R29.703 NIHSS SCORE 3: ICD-10-CM

## 2019-09-11 DIAGNOSIS — D50.9 IRON DEFICIENCY ANEMIA, UNSPECIFIED: ICD-10-CM

## 2019-09-11 DIAGNOSIS — Z79.82 LONG TERM (CURRENT) USE OF ASPIRIN: ICD-10-CM

## 2019-09-11 DIAGNOSIS — N30.00 ACUTE CYSTITIS WITHOUT HEMATURIA: ICD-10-CM

## 2019-09-16 ENCOUNTER — NON-APPOINTMENT (OUTPATIENT)
Age: 84
End: 2019-09-16

## 2019-09-16 ENCOUNTER — APPOINTMENT (OUTPATIENT)
Dept: CARDIOLOGY | Facility: CLINIC | Age: 84
End: 2019-09-16
Payer: MEDICARE

## 2019-09-16 VITALS
BODY MASS INDEX: 14.79 KG/M2 | SYSTOLIC BLOOD PRESSURE: 136 MMHG | WEIGHT: 92 LBS | DIASTOLIC BLOOD PRESSURE: 65 MMHG | RESPIRATION RATE: 17 BRPM | HEART RATE: 76 BPM | HEIGHT: 66 IN | OXYGEN SATURATION: 98 %

## 2019-09-16 PROCEDURE — 93000 ELECTROCARDIOGRAM COMPLETE: CPT

## 2019-09-16 PROCEDURE — 99215 OFFICE O/P EST HI 40 MIN: CPT

## 2019-09-16 NOTE — CARDIOLOGY SUMMARY
[___] : [unfilled] [___] : [unfilled] [None] : normal LV function [Mild] : mild pulmonary hypertension [Enlarged] : enlarged LA size [Severe] : severe mitral regurgitation

## 2019-09-16 NOTE — REASON FOR VISIT
[Follow-Up - From Hospitalization] : follow-up of a recent hospitalization for [Hypertension] : hypertension [FreeTextEntry2] : h/o er for head bobbing forward and dxd with UTI, gave abx and then diarhea, since eratic BP, s/p hosp for cva, eliquis started

## 2019-09-16 NOTE — DISCUSSION/SUMMARY
[Atrial Fibrillation] : atrial fibrillation [Rate Control] : rate control [Anticoagulation] : anticoagulation [Hypertension] : hypertension [Stable] : stable [Responding to Treatment] : responding to treatment [Outpatient Evaluation] : outpatient evaluation [Ambulatory BP Monitoring] : ambulatory blood pressure monitoring [None] : none [Sodium Restriction] : sodium restriction

## 2019-09-16 NOTE — PHYSICAL EXAM
[General Appearance - Well Developed] : well developed [Normal Appearance] : normal appearance [Well Groomed] : well groomed [General Appearance - Well Nourished] : well nourished [No Deformities] : no deformities [General Appearance - In No Acute Distress] : no acute distress [Normal Conjunctiva] : the conjunctiva exhibited no abnormalities [Eyelids - No Xanthelasma] : the eyelids demonstrated no xanthelasmas [Normal Oral Mucosa] : normal oral mucosa [No Oral Pallor] : no oral pallor [No Oral Cyanosis] : no oral cyanosis [Normal Jugular Venous A Waves Present] : normal jugular venous A waves present [Normal Jugular Venous V Waves Present] : normal jugular venous V waves present [No Jugular Venous Bagley A Waves] : no jugular venous bagley A waves [Respiration, Rhythm And Depth] : normal respiratory rhythm and effort [Exaggerated Use Of Accessory Muscles For Inspiration] : no accessory muscle use [Auscultation Breath Sounds / Voice Sounds] : lungs were clear to auscultation bilaterally [Abdomen Soft] : soft [Abdomen Tenderness] : non-tender [] : no hepato-splenomegaly [Abdomen Mass (___ Cm)] : no abdominal mass palpated [Abnormal Walk] : normal gait [Gait - Sufficient For Exercise Testing] : the gait was sufficient for exercise testing [FreeTextEntry1] : left leg discolorization-c/w possible cellulitis [Oriented To Time, Place, And Person] : oriented to person, place, and time [Affect] : the affect was normal [Mood] : the mood was normal [No Anxiety] : not feeling anxious [Irregularly Irregular] : irregularly irregular [___ +] : bilateral [unfilled]U+ pretibial pitting edema

## 2019-09-17 ENCOUNTER — APPOINTMENT (OUTPATIENT)
Dept: INTERNAL MEDICINE | Facility: CLINIC | Age: 84
End: 2019-09-17
Payer: MEDICARE

## 2019-09-17 VITALS
TEMPERATURE: 97.7 F | SYSTOLIC BLOOD PRESSURE: 126 MMHG | HEART RATE: 64 BPM | DIASTOLIC BLOOD PRESSURE: 68 MMHG | OXYGEN SATURATION: 96 % | RESPIRATION RATE: 16 BRPM

## 2019-09-17 VITALS — HEIGHT: 66 IN | BODY MASS INDEX: 15.11 KG/M2 | WEIGHT: 94 LBS

## 2019-09-17 DIAGNOSIS — F32.9 MAJOR DEPRESSIVE DISORDER, SINGLE EPISODE, UNSPECIFIED: ICD-10-CM

## 2019-09-17 DIAGNOSIS — Z23 ENCOUNTER FOR IMMUNIZATION: ICD-10-CM

## 2019-09-17 PROCEDURE — G0008: CPT

## 2019-09-17 PROCEDURE — 99215 OFFICE O/P EST HI 40 MIN: CPT | Mod: 25

## 2019-09-17 PROCEDURE — 90662 IIV NO PRSV INCREASED AG IM: CPT

## 2019-09-17 RX ORDER — HYDROCORTISONE VALERATE 2 MG/G
0.2 CREAM TOPICAL TWICE DAILY
Qty: 1 | Refills: 5 | Status: DISCONTINUED | COMMUNITY
Start: 2017-02-03 | End: 2019-09-17

## 2019-09-17 RX ORDER — MONTELUKAST 10 MG/1
10 TABLET, FILM COATED ORAL
Qty: 30 | Refills: 5 | Status: DISCONTINUED | COMMUNITY
Start: 2017-08-23 | End: 2019-09-17

## 2019-09-17 RX ORDER — FLUTICASONE PROPIONATE 50 UG/1
50 SPRAY, METERED NASAL DAILY
Qty: 1 | Refills: 3 | Status: DISCONTINUED | COMMUNITY
Start: 2017-02-03 | End: 2019-09-17

## 2019-09-17 RX ORDER — AMLODIPINE BESYLATE 2.5 MG/1
2.5 TABLET ORAL DAILY
Qty: 90 | Refills: 0 | Status: DISCONTINUED | COMMUNITY
Start: 2019-03-20 | End: 2019-09-17

## 2019-09-17 NOTE — ASSESSMENT
[FreeTextEntry1] : Physical exam shows a well-developed female in no acute distress conversant and in good spirits her blood pressure was 126/68 height 5 foot 6 inches weight 94 pounds heart rate is 64 respirations at 16 HEENT was unremarkable chest was clear cardiovascular exam showed normal S1 and S2 with no extra heart sounds or murmurs abdomen was soft extremities showed no edema there was 2/5 lower extremity strength on the left leg and 2/5 strengthleft upper extremity. Prescription given for a physical and occupational therapy in view of the fact that she is moving both the arm and the leg I anticipate with aggressive rehabilitation significant recovery she will continue to be anticoagulated for her atrial fibrillation she has recently followed up with her cardiologist Dr. Hao Bains well home [facility] is participating with her care at home

## 2019-09-17 NOTE — PHYSICAL EXAM
[No Acute Distress] : no acute distress [Well Nourished] : well nourished [Well Developed] : well developed [Well-Appearing] : well-appearing [Normal Voice/Communication] : normal voice/communication [Normal Sclera/Conjunctiva] : normal sclera/conjunctiva [EOMI] : extraocular movements intact [PERRL] : pupils equal round and reactive to light [Normal Outer Ear/Nose] : the outer ears and nose were normal in appearance [Normal Oropharynx] : the oropharynx was normal [Normal TMs] : both tympanic membranes were normal [Normal Nasal Mucosa] : the nasal mucosa was normal [No JVD] : no jugular venous distention [No Lymphadenopathy] : no lymphadenopathy [Supple] : supple [Thyroid Normal, No Nodules] : the thyroid was normal and there were no nodules present [No Respiratory Distress] : no respiratory distress  [No Accessory Muscle Use] : no accessory muscle use [Clear to Auscultation] : lungs were clear to auscultation bilaterally [Normal Percussion] : the chest was normal to percussion [Normal Rate] : normal rate  [Regular Rhythm] : with a regular rhythm [Normal S1, S2] : normal S1 and S2 [No Murmur] : no murmur heard [No Carotid Bruits] : no carotid bruits [No Abdominal Bruit] : a ~M bruit was not heard ~T in the abdomen [No Varicosities] : no varicosities [Pedal Pulses Present] : the pedal pulses are present [No Edema] : there was no peripheral edema [No Palpable Aorta] : no palpable aorta [No Extremity Clubbing/Cyanosis] : no extremity clubbing/cyanosis [Declined Breast Exam] : declined breast exam  [Soft] : abdomen soft [Non Tender] : non-tender [Non-distended] : non-distended [No Masses] : no abdominal mass palpated [No HSM] : no HSM [No Hernias] : no hernias [Normal Bowel Sounds] : normal bowel sounds [Declined Rectal Exam] : declined rectal exam [Normal Supraclavicular Nodes] : no supraclavicular lymphadenopathy [Normal Axillary Nodes] : no axillary lymphadenopathy [Normal Posterior Cervical Nodes] : no posterior cervical lymphadenopathy [Normal Inguinal Nodes] : no inguinal lymphadenopathy [Normal Anterior Cervical Nodes] : no anterior cervical lymphadenopathy [Normal Femoral Nodes] : no femoral lymphadenopathy [No CVA Tenderness] : no CVA  tenderness [No Spinal Tenderness] : no spinal tenderness [No Joint Swelling] : no joint swelling [Grossly Normal Strength/Tone] : grossly normal strength/tone [No Rash] : no rash [No Skin Lesions] : no skin lesions [Coordination Grossly Intact] : coordination grossly intact [Normal Gait] : normal gait [No Focal Deficits] : no focal deficits [Deep Tendon Reflexes (DTR)] : deep tendon reflexes were 2+ and symmetric [Speech Grossly Normal] : speech grossly normal [Normal Affect] : the affect was normal [Memory Grossly Normal] : memory grossly normal [Alert and Oriented x3] : oriented to person, place, and time [Normal Mood] : the mood was normal [Normal Insight/Judgement] : insight and judgment were intact [Scoliosis] : no scoliosis [Kyphosis] : no kyphosis [Acne] : no acne

## 2019-09-17 NOTE — REVIEW OF SYSTEMS
[Fatigue] : fatigue [Hearing Loss] : hearing loss [Nasal Discharge] : nasal discharge [Postnasal Drip] : postnasal drip [Constipation] : constipation [Poor Libido] : poor libido [Nocturia] : nocturia [Joint Stiffness] : joint stiffness [Muscle Weakness] : muscle weakness [Back Pain] : back pain [Insomnia] : insomnia [Anxiety] : anxiety [Depression] : depression [Negative] : Psychiatric [Fever] : no fever [Chills] : no chills [Night Sweats] : no night sweats [Hot Flashes] : no hot flashes [Recent Change In Weight] : ~T no recent weight change [Discharge] : no discharge [Redness] : no redness [Pain] : no pain [Dryness] : no dryness  [Vision Problems] : no vision problems [Earache] : no earache [Itching] : no itching [Sore Throat] : no sore throat [Hoarseness] : no hoarseness [Chest Pain] : no chest pain [Palpitations] : no palpitations [Leg Claudication] : no leg claudication [Lower Ext Edema] : no lower extremity edema [Orthopnea] : no orthopnea [Paroysmal Nocturnal Dyspnea] : no paroysmal nocturnal dyspnea [Wheezing] : no wheezing [Shortness Of Breath] : no shortness of breath [Dyspnea on Exertion] : no dyspnea on exertion [Cough] : no cough [Abdominal Pain] : no abdominal pain [Diarrhea] : diarrhea [Nausea] : no nausea [Heartburn] : no heartburn [Vomiting] : no vomiting [Dysuria] : no dysuria [Melena] : no melena [Hematuria] : no hematuria [Incontinence] : no incontinence [Vaginal Discharge] : no vaginal discharge [Frequency] : no frequency [Joint Pain] : no joint pain [Dysmenorrhea] : no dysmenorrhea [Joint Swelling] : no joint swelling [Muscle Pain] : no muscle pain [Nail Changes] : no nail changes [Hair Changes] : no hair changes [Mole Changes] : no mole changes [Headache] : no headache [Skin Rash] : no skin rash [Dizziness] : no dizziness [Fainting] : no fainting [Confusion] : no confusion [Memory Loss] : no memory loss [Unsteady Walking] : no ataxia [Suicidal] : not suicidal [Easy Bleeding] : no easy bleeding [Easy Bruising] : no easy bruising [FreeTextEntry9] : Left-sided weakness of the arm and leg [Swollen Glands] : no swollen glands

## 2019-09-17 NOTE — HISTORY OF PRESENT ILLNESS
[Post-hospitalization from ___ Hospital] : Post-hospitalization from [unfilled] Hospital [Discharged on ___] : discharged on [unfilled] [FreeTextEntry2] : 89-year-old woman recently hospitalized at [facility] after a CVA with left-sided weakness patient since that time has been improving with her strength in the left arm and leg while in hospital she was begun on Eliquis because of her atrial fibrillation and her neurologic event she has been doing well since discharge her speech and swallow tests have been improving and she is back to completely regular food she has had no temperature chills sweats or myalgias she denies headache sinus congestion sore throat cough wheezing pleurisy chest pain shortness of breath exertional dyspnea lightheadedness palpitations dizziness vertigo or syncope she denies abdominal pain nausea vomiting diarrhea or constipation bright red blood per rectum or black stools she gets up occasionally at night to urinate but denies dysuria or gross hematuria she has had no leg edema and has had no recent skin rashes

## 2019-09-17 NOTE — HEALTH RISK ASSESSMENT
[No] : No [No falls in past year] : Patient reported no falls in the past year [0] : 1) Little interest or pleasure doing things: Not at all (0) [HIV test declined] : HIV test declined [Hepatitis C test declined] : Hepatitis C test declined [[id number]] : 0 [] : No

## 2019-11-13 ENCOUNTER — INPATIENT (INPATIENT)
Facility: HOSPITAL | Age: 84
LOS: 4 days | Discharge: HOME HEALTH | DRG: 177 | End: 2019-11-18
Attending: FAMILY MEDICINE | Admitting: HOSPITALIST
Payer: MEDICARE

## 2019-11-13 VITALS
SYSTOLIC BLOOD PRESSURE: 135 MMHG | TEMPERATURE: 98 F | RESPIRATION RATE: 19 BRPM | WEIGHT: 100.09 LBS | DIASTOLIC BLOOD PRESSURE: 90 MMHG | OXYGEN SATURATION: 93 % | HEIGHT: 63 IN | HEART RATE: 75 BPM

## 2019-11-13 DIAGNOSIS — Z96.642 PRESENCE OF LEFT ARTIFICIAL HIP JOINT: ICD-10-CM

## 2019-11-13 DIAGNOSIS — Z98.41 CATARACT EXTRACTION STATUS, RIGHT EYE: Chronic | ICD-10-CM

## 2019-11-13 DIAGNOSIS — Z96.642 PRESENCE OF LEFT ARTIFICIAL HIP JOINT: Chronic | ICD-10-CM

## 2019-11-13 LAB
ALBUMIN SERPL ELPH-MCNC: 3.1 G/DL — LOW (ref 3.3–5)
ALP SERPL-CCNC: 71 U/L — SIGNIFICANT CHANGE UP (ref 40–120)
ALT FLD-CCNC: 13 U/L — SIGNIFICANT CHANGE UP (ref 12–78)
ANION GAP SERPL CALC-SCNC: 5 MMOL/L — SIGNIFICANT CHANGE UP (ref 5–17)
APTT BLD: 32.6 SEC — SIGNIFICANT CHANGE UP (ref 27.5–36.3)
AST SERPL-CCNC: 14 U/L — LOW (ref 15–37)
BASOPHILS # BLD AUTO: 0.04 K/UL — SIGNIFICANT CHANGE UP (ref 0–0.2)
BASOPHILS NFR BLD AUTO: 0.3 % — SIGNIFICANT CHANGE UP (ref 0–2)
BILIRUB SERPL-MCNC: 2 MG/DL — HIGH (ref 0.2–1.2)
BUN SERPL-MCNC: 21 MG/DL — SIGNIFICANT CHANGE UP (ref 7–23)
CALCIUM SERPL-MCNC: 8.9 MG/DL — SIGNIFICANT CHANGE UP (ref 8.5–10.1)
CHLORIDE SERPL-SCNC: 103 MMOL/L — SIGNIFICANT CHANGE UP (ref 96–108)
CO2 SERPL-SCNC: 30 MMOL/L — SIGNIFICANT CHANGE UP (ref 22–31)
CREAT SERPL-MCNC: 0.79 MG/DL — SIGNIFICANT CHANGE UP (ref 0.5–1.3)
EOSINOPHIL # BLD AUTO: 0.02 K/UL — SIGNIFICANT CHANGE UP (ref 0–0.5)
EOSINOPHIL NFR BLD AUTO: 0.1 % — SIGNIFICANT CHANGE UP (ref 0–6)
GLUCOSE SERPL-MCNC: 116 MG/DL — HIGH (ref 70–99)
HCT VFR BLD CALC: 38 % — SIGNIFICANT CHANGE UP (ref 34.5–45)
HGB BLD-MCNC: 11.9 G/DL — SIGNIFICANT CHANGE UP (ref 11.5–15.5)
IMM GRANULOCYTES NFR BLD AUTO: 0.5 % — SIGNIFICANT CHANGE UP (ref 0–1.5)
INR BLD: 1.4 RATIO — HIGH (ref 0.88–1.16)
LACTATE SERPL-SCNC: 1 MMOL/L — SIGNIFICANT CHANGE UP (ref 0.7–2)
LYMPHOCYTES # BLD AUTO: 0.9 K/UL — LOW (ref 1–3.3)
LYMPHOCYTES # BLD AUTO: 6.7 % — LOW (ref 13–44)
MCHC RBC-ENTMCNC: 29.2 PG — SIGNIFICANT CHANGE UP (ref 27–34)
MCHC RBC-ENTMCNC: 31.3 GM/DL — LOW (ref 32–36)
MCV RBC AUTO: 93.4 FL — SIGNIFICANT CHANGE UP (ref 80–100)
MONOCYTES # BLD AUTO: 0.91 K/UL — HIGH (ref 0–0.9)
MONOCYTES NFR BLD AUTO: 6.8 % — SIGNIFICANT CHANGE UP (ref 2–14)
NEUTROPHILS # BLD AUTO: 11.45 K/UL — HIGH (ref 1.8–7.4)
NEUTROPHILS NFR BLD AUTO: 85.6 % — HIGH (ref 43–77)
NT-PROBNP SERPL-SCNC: 4215 PG/ML — HIGH (ref 0–450)
PLATELET # BLD AUTO: 189 K/UL — SIGNIFICANT CHANGE UP (ref 150–400)
POTASSIUM SERPL-MCNC: 4.5 MMOL/L — SIGNIFICANT CHANGE UP (ref 3.5–5.3)
POTASSIUM SERPL-SCNC: 4.5 MMOL/L — SIGNIFICANT CHANGE UP (ref 3.5–5.3)
PROT SERPL-MCNC: 6.9 GM/DL — SIGNIFICANT CHANGE UP (ref 6–8.3)
PROTHROM AB SERPL-ACNC: 15.7 SEC — HIGH (ref 10–12.9)
RAPID RVP RESULT: SIGNIFICANT CHANGE UP
RBC # BLD: 4.07 M/UL — SIGNIFICANT CHANGE UP (ref 3.8–5.2)
RBC # FLD: 15.1 % — HIGH (ref 10.3–14.5)
SODIUM SERPL-SCNC: 138 MMOL/L — SIGNIFICANT CHANGE UP (ref 135–145)
WBC # BLD: 13.39 K/UL — HIGH (ref 3.8–10.5)
WBC # FLD AUTO: 13.39 K/UL — HIGH (ref 3.8–10.5)

## 2019-11-13 PROCEDURE — 85027 COMPLETE CBC AUTOMATED: CPT

## 2019-11-13 PROCEDURE — 71045 X-RAY EXAM CHEST 1 VIEW: CPT | Mod: 26

## 2019-11-13 PROCEDURE — 36415 COLL VENOUS BLD VENIPUNCTURE: CPT

## 2019-11-13 PROCEDURE — 92523 SPEECH SOUND LANG COMPREHEN: CPT | Mod: GN

## 2019-11-13 PROCEDURE — 94640 AIRWAY INHALATION TREATMENT: CPT

## 2019-11-13 PROCEDURE — 87449 NOS EACH ORGANISM AG IA: CPT

## 2019-11-13 PROCEDURE — 74177 CT ABD & PELVIS W/CONTRAST: CPT | Mod: 26

## 2019-11-13 PROCEDURE — 71260 CT THORAX DX C+: CPT | Mod: 26

## 2019-11-13 PROCEDURE — 93010 ELECTROCARDIOGRAM REPORT: CPT

## 2019-11-13 PROCEDURE — 87450: CPT

## 2019-11-13 PROCEDURE — 92610 EVALUATE SWALLOWING FUNCTION: CPT | Mod: GN

## 2019-11-13 RX ORDER — LOSARTAN POTASSIUM 100 MG/1
50 TABLET, FILM COATED ORAL DAILY
Refills: 0 | Status: DISCONTINUED | OUTPATIENT
Start: 2019-11-13 | End: 2019-11-18

## 2019-11-13 RX ORDER — APIXABAN 2.5 MG/1
2.5 TABLET, FILM COATED ORAL EVERY 12 HOURS
Refills: 0 | Status: DISCONTINUED | OUTPATIENT
Start: 2019-11-13 | End: 2019-11-18

## 2019-11-13 RX ORDER — TRIAMCINOLONE ACETONIDE 55 MCG
2 AEROSOL, SPRAY (GRAM) NASAL
Qty: 0 | Refills: 0 | DISCHARGE

## 2019-11-13 RX ORDER — ESCITALOPRAM OXALATE 10 MG/1
5 TABLET, FILM COATED ORAL
Refills: 0 | Status: DISCONTINUED | OUTPATIENT
Start: 2019-11-13 | End: 2019-11-18

## 2019-11-13 RX ORDER — ESCITALOPRAM OXALATE 10 MG/1
0.5 TABLET, FILM COATED ORAL
Qty: 0 | Refills: 0 | DISCHARGE

## 2019-11-13 RX ORDER — FLUTICASONE PROPIONATE 50 MCG
2 SPRAY, SUSPENSION NASAL DAILY
Refills: 0 | Status: DISCONTINUED | OUTPATIENT
Start: 2019-11-13 | End: 2019-11-18

## 2019-11-13 RX ORDER — PREGABALIN 225 MG/1
1000 CAPSULE ORAL DAILY
Refills: 0 | Status: DISCONTINUED | OUTPATIENT
Start: 2019-11-13 | End: 2019-11-18

## 2019-11-13 RX ORDER — METOPROLOL TARTRATE 50 MG
1 TABLET ORAL
Qty: 0 | Refills: 0 | DISCHARGE

## 2019-11-13 RX ORDER — CEFEPIME 1 G/1
1000 INJECTION, POWDER, FOR SOLUTION INTRAMUSCULAR; INTRAVENOUS ONCE
Refills: 0 | Status: DISCONTINUED | OUTPATIENT
Start: 2019-11-13 | End: 2019-11-13

## 2019-11-13 RX ORDER — METOPROLOL TARTRATE 50 MG
25 TABLET ORAL
Refills: 0 | Status: DISCONTINUED | OUTPATIENT
Start: 2019-11-13 | End: 2019-11-18

## 2019-11-13 RX ORDER — VANCOMYCIN HCL 1 G
750 VIAL (EA) INTRAVENOUS ONCE
Refills: 0 | Status: COMPLETED | OUTPATIENT
Start: 2019-11-13 | End: 2019-11-13

## 2019-11-13 RX ORDER — LOSARTAN POTASSIUM 100 MG/1
1 TABLET, FILM COATED ORAL
Qty: 0 | Refills: 0 | DISCHARGE

## 2019-11-13 RX ORDER — PREGABALIN 225 MG/1
1 CAPSULE ORAL
Qty: 0 | Refills: 0 | DISCHARGE

## 2019-11-13 RX ORDER — LEVOTHYROXINE SODIUM 125 MCG
1 TABLET ORAL
Qty: 0 | Refills: 0 | DISCHARGE

## 2019-11-13 RX ORDER — LEVOTHYROXINE SODIUM 125 MCG
25 TABLET ORAL DAILY
Refills: 0 | Status: DISCONTINUED | OUTPATIENT
Start: 2019-11-13 | End: 2019-11-18

## 2019-11-13 RX ORDER — AZITHROMYCIN 500 MG/1
500 TABLET, FILM COATED ORAL ONCE
Refills: 0 | Status: COMPLETED | OUTPATIENT
Start: 2019-11-13 | End: 2019-11-13

## 2019-11-13 RX ORDER — AZITHROMYCIN 500 MG/1
TABLET, FILM COATED ORAL
Refills: 0 | Status: COMPLETED | OUTPATIENT
Start: 2019-11-13 | End: 2019-11-15

## 2019-11-13 RX ORDER — ATORVASTATIN CALCIUM 80 MG/1
80 TABLET, FILM COATED ORAL AT BEDTIME
Refills: 0 | Status: DISCONTINUED | OUTPATIENT
Start: 2019-11-13 | End: 2019-11-18

## 2019-11-13 RX ORDER — AZITHROMYCIN 500 MG/1
500 TABLET, FILM COATED ORAL EVERY 24 HOURS
Refills: 0 | Status: COMPLETED | OUTPATIENT
Start: 2019-11-14 | End: 2019-11-15

## 2019-11-13 RX ORDER — CEFEPIME 1 G/1
1000 INJECTION, POWDER, FOR SOLUTION INTRAMUSCULAR; INTRAVENOUS ONCE
Refills: 0 | Status: COMPLETED | OUTPATIENT
Start: 2019-11-13 | End: 2019-11-13

## 2019-11-13 RX ORDER — SODIUM CHLORIDE 9 MG/ML
1350 INJECTION INTRAMUSCULAR; INTRAVENOUS; SUBCUTANEOUS ONCE
Refills: 0 | Status: COMPLETED | OUTPATIENT
Start: 2019-11-13 | End: 2019-11-13

## 2019-11-13 RX ORDER — CEFEPIME 1 G/1
1000 INJECTION, POWDER, FOR SOLUTION INTRAMUSCULAR; INTRAVENOUS EVERY 12 HOURS
Refills: 0 | Status: DISCONTINUED | OUTPATIENT
Start: 2019-11-13 | End: 2019-11-18

## 2019-11-13 RX ADMIN — ESCITALOPRAM OXALATE 5 MILLIGRAM(S): 10 TABLET, FILM COATED ORAL at 22:59

## 2019-11-13 RX ADMIN — Medication 250 MILLIGRAM(S): at 18:22

## 2019-11-13 RX ADMIN — LOSARTAN POTASSIUM 50 MILLIGRAM(S): 100 TABLET, FILM COATED ORAL at 23:05

## 2019-11-13 RX ADMIN — AZITHROMYCIN 255 MILLIGRAM(S): 500 TABLET, FILM COATED ORAL at 23:12

## 2019-11-13 RX ADMIN — SODIUM CHLORIDE 1350 MILLILITER(S): 9 INJECTION INTRAMUSCULAR; INTRAVENOUS; SUBCUTANEOUS at 16:50

## 2019-11-13 RX ADMIN — APIXABAN 2.5 MILLIGRAM(S): 2.5 TABLET, FILM COATED ORAL at 22:59

## 2019-11-13 RX ADMIN — Medication 25 MILLIGRAM(S): at 22:59

## 2019-11-13 RX ADMIN — CEFEPIME 1000 MILLIGRAM(S): 1 INJECTION, POWDER, FOR SOLUTION INTRAMUSCULAR; INTRAVENOUS at 18:14

## 2019-11-13 NOTE — ED ADULT NURSE REASSESSMENT NOTE - NS ED NURSE REASSESS COMMENT FT1
Due to poor peripheral access, able to obtain one set of BC. Phlebotomist called and at bedside for second BC draw. Delay to administration of ABX due to poor access.

## 2019-11-13 NOTE — ED PROVIDER NOTE - CLINICAL SUMMARY MEDICAL DECISION MAKING FREE TEXT BOX
Pt with hx CVA, HTN here for cough, weakness, jaundice, RUQ pain and vomiting. Plan: labs, fluids, abx, CT and admission.

## 2019-11-13 NOTE — ED ADULT NURSE NOTE - OBJECTIVE STATEMENT
Patient presents to ED complaining of vomiting. Daughter states patient has been lethargic, increased weakness and decreased PO intake since yesterday. As per patient aid patient vomited after eating today. Patient afebrile on arrival. Patient lethargic on arrival, A&0x2, daughter states patient is confused at baseline. Patient is non ambulatory at home. PMHx AFIB

## 2019-11-13 NOTE — H&P ADULT - MOTOR
grossly intact. Moving all the extremities. but lot of chronic deformities is compromising the movement.

## 2019-11-13 NOTE — PHARMACOTHERAPY INTERVENTION NOTE - COMMENTS
med history complete, reviewed medications with patients family and confirmed with doctor first med profile

## 2019-11-13 NOTE — H&P ADULT - NSHPPHYSICALEXAM_GEN_ALL_CORE
Vital Signs Last 24 Hrs  T(C): 36.4 (13 Nov 2019 15:10), Max: 36.4 (13 Nov 2019 15:10)  T(F): 97.6 (13 Nov 2019 15:10), Max: 97.6 (13 Nov 2019 15:10)  HR: 78 (13 Nov 2019 22:33) (75 - 78)  BP: 136/59 (13 Nov 2019 22:33) (135/90 - 136/59)  RR: 16 (13 Nov 2019 22:33) (16 - 19)  SpO2: 99% (13 Nov 2019 22:33) (93% - 99%)

## 2019-11-13 NOTE — ED ADULT TRIAGE NOTE - CHIEF COMPLAINT QUOTE
pt BIBEMS from home c/o vomiting immediately after eating today and en route to ED. pt w/ generalized malaise per family since yesterday, more lethargic than usual. non-ambulatory at baseline. pt 93% on NRB

## 2019-11-13 NOTE — H&P ADULT - HISTORY OF PRESENT ILLNESS
89 y/o female with a PMHx of afib, HTN, hypothyroid, anemia, recent stroke, ataxia presents to the ED with an increased productive cough for 3 days increased lethargy and weakness since yesterday as well as slurring of speech. Today pt was with her aid at home and started vomiting and c/o RUQ pain. No fever or diarrhea. EMS placed O2 because pt was very lethargic. Nonsmoker, no ETOH, no drugs. Besides fatigue pt has no other complaints of pain. 89 yo female with a PMHx of afib, HTN, hypothyroid, anemia, recent stroke, ataxia presents to the ED with an increased productive cough for 3 days increased lethargy and weakness since yesterday. Today patient was with her aid at home and started vomiting during her lunch. No fever or diarrhea. EMS placed O2 because patient was very lethargic. Patient is unable to provide any history due to her baseline status(like confusion). Her daughter at the bedside provided the history. As per the daughter, patient is having cough with productive sputum over 1 week. But she had no fever. She was also looking very weak for the last 1 week. She was not eating properly for the last 2-3 days. Usually she is able to eat all types of food. But today she had vomiting during lunch time and became more lethargic. At that time, ambulance was called in. Nonsmoker, no ETOH, no drugs.     Family Hx:  patient is unable to provide detail family history at this time. Ms. Castillo is a single, 40-year-old  female, non caregiver, unemployed, domiciled on [place] grounds, with past psychiatric history of  Schizoaffective Disorder, Borderline Personality Disorder, cannabis abuse, multiple inpatient psychiatric hospitalizations (>20), recently Low 6 10/17/18-10/30/18, with frequent visits to the [facility] (well-known to staff, last on 12/7) 3 prior suicide attempts (last in 2012 via OD), recurrent suicidal gestures and suicidal ideation, history of property destruction when upset, no history of arrests or access to weapons, was BIB EMS called by staff at [place] due to agitation after pulling the fire alarm.      On interview, pt is A+Ox3, she is cooperative, no PMA; with long silver manicure nails, and gold glitter on her cheek;, provocative and dramatic, eye contact is good, mood is euthymic, affect is reactive, thought processes are linear. Patient states that she was sent to the ED because "I was going to cut my neck.. .to sacrifice myself to Allah,". She denies actually cutting her neck. She reports chronic passive suicidal ideation but denies current suicidal intent or plan. She states that she does not want to go back to [place] [address], and is unhappy that [facility] is not allowing her back, so she wants admission to [facility] "so I can learn DBT skills". She states "I don't do anything with my life. .I need help". She states that sometimes she feels hopeless but other times she "wants something to live for". She states that she is "living in hell" and she decided to color her hair and put gold spray paint on her face and "put on war paint". She denies delusions/AVH. She states that she has not taken her medication for one week because it "makes me too tired".    MD spoke with patient's  Ms. Camargo who reports the patient has exhibited a lot of bizarre behavior since last week. She has not taken her medication since last Tuesday when she went to the respite center. Saturday she was observed walking around the first floor of the residence naked. On Monday she flooded her room and did not report to anyone until staff noticed the puddle outside her room. Today she was rolling around on the floor. She has a broken television in her room and brought a piece of the broken television out of her room and was swinging it around and banging it on the floor. She was  very agitated when Ms. Camargo attempted to meet with her. She was spoken with by their deescalation team several times today without any change in behavior. At 11pm tonight, she ran out of her room and pulled the fire alarm twice unprovoked. When EMS came, she was screaming and cursing. She states she was going to kill  staff. Ms. Camargo states at baseline she is much better and she is not sure if the patient used any substances. Ms. Castillo is a single, 40-year-old  female, non caregiver, unemployed, domiciled on [place] grounds, with past psychiatric history of  Schizoaffective Disorder, Borderline Personality Disorder, cannabis abuse, multiple inpatient psychiatric hospitalizations (>20), recently Low 6 10/17/18-10/30/18, with frequent visits to the [facility] (well-known to staff, last on 12/7) 3 prior suicide attempts (last in 2012 via OD), recurrent suicidal gestures and suicidal ideation, history of property destruction when upset, no history of arrests or access to weapons, was BIB EMS called by staff at [place] due to agitation after pulling the fire alarm.     On interview, patient is labile and quickly becomes irritable. She is internally preoccupied, laughing and talking to herself. She screams out "I need electric shocks" repeatedly. She is observed to disrobe and be provocative and sexually inappropriate towards staff and other patients in the emergency room. She states she is hearing voices telling her to hurt people "for the love of [place]." She speaks nonsensical at times and quickly becomes irritable and not want to answer MD questions. She laughs when asked if she is adherent to medication.    MD spoke with patient's  Ms. Camargo who reports the patient has exhibited a lot of bizarre behavior since last week. She has not taken her medication since last Tuesday when she went to the respite center. Saturday she was observed walking around the first floor of the residence naked. On Monday she flooded her room and did not report to anyone until staff noticed the puddle outside her room. Today she was rolling around on the floor. She has a broken television in her room and brought a piece of the broken television out of her room and was swinging it around and banging it on the floor. She was  very agitated when Ms. Camargo attempted to meet with her. She was spoken with by their deescalation team several times today without any change in behavior. At 11pm tonight, she ran out of her room and pulled the fire alarm twice unprovoked. When EMS came, she was screaming and cursing. She states she was going to kill  staff. Ms. Camargo states at baseline she is much better and she is not sure if the patient used any substances.

## 2019-11-13 NOTE — H&P ADULT - ASSESSMENT
89 yo Female presented with cough and weakness.    A/P:    1.  Pneumonia  HCAP  -continue IV cefepime and Zithromax  -follow ID consult for vancomycin dosing.   -follow cultures  -give oxygen as needed    2.  Chronic A fib  -HR controlled  -continue metoprolol and Eliquis    3.  HTN  -stable  -continue current medications    4.  Hypothyroidism  -continue Levothyroxine    5.  Lesion in Liver  -?hemangioma vs malignancy   -can be followed in outpatient setting  -patient daughter agreed with the plan     6.  Code status: Full code.

## 2019-11-13 NOTE — ED PROVIDER NOTE - OBJECTIVE STATEMENT
89 y/o female with a PMHx of afib, HTN, hypothyroid, anemia, recent stroke, ataxia presents to the ED with an increased productive cough for 3 days increased lethargy and weakness since yesterday as well as slurring of speech. Today pt was with her aid at home and started vomiting and c/o RLQ pain. No fever or diarrhea. EMS placed O2 because pt was very lethargic. Nonsmoker, no ETOH, no drugs. Besides fatigue pt has no other complaints of pain. 89 y/o female with a PMHx of afib, HTN, hypothyroid, anemia, recent stroke, ataxia presents to the ED with an increased productive cough for 3 days increased lethargy and weakness since yesterday as well as slurring of speech. Today pt was with her aid at home and started vomiting and c/o RUQ pain. No fever or diarrhea. EMS placed O2 because pt was very lethargic. Nonsmoker, no ETOH, no drugs. Besides fatigue pt has no other complaints of pain.

## 2019-11-13 NOTE — ED PROVIDER NOTE - CONSTITUTIONAL, MLM
normal... Well appearing, well nourished, awake, alert, oriented to person, place, time/situation and in no apparent distress. on 100% O2- saturation dropped to 88% when pt was on room air. Pt replaced with 5 liters nasal canula.

## 2019-11-13 NOTE — PATIENT PROFILE ADULT - INDICATE TYPE
Health Care Proxy (HCP) The topic of DNR/DNI discussed with daughter and RN-further discussion to be done by MD/Health Care Proxy (HCP)

## 2019-11-13 NOTE — ED PROVIDER NOTE - CPE EDP NEURO NORM
----- Message from Elia Tyler MD sent at 8/22/2018  9:34 AM CDT -----  Please inform the patient that her average blood sugar over the past 3 months continues to slowly increase. She should continue to try to stay as active as she can, continue working on decreasing portion size as she is doing, and trying to increase her fruits and vegetables, while decreasing her simple carbohydrates. She continues to spill an acceptable amount of protein from her kidneys-and should continue her blood pressure medications as currently. Her metabolic panel is otherwise stable except for a slightly elevated blood sugar.   normal...

## 2019-11-14 LAB
HCT VFR BLD CALC: 35.7 % — SIGNIFICANT CHANGE UP (ref 34.5–45)
HGB BLD-MCNC: 11.3 G/DL — LOW (ref 11.5–15.5)
MCHC RBC-ENTMCNC: 29.2 PG — SIGNIFICANT CHANGE UP (ref 27–34)
MCHC RBC-ENTMCNC: 31.7 GM/DL — LOW (ref 32–36)
MCV RBC AUTO: 92.2 FL — SIGNIFICANT CHANGE UP (ref 80–100)
PLATELET # BLD AUTO: 181 K/UL — SIGNIFICANT CHANGE UP (ref 150–400)
RBC # BLD: 3.87 M/UL — SIGNIFICANT CHANGE UP (ref 3.8–5.2)
RBC # FLD: 15 % — HIGH (ref 10.3–14.5)
WBC # BLD: 10.44 K/UL — SIGNIFICANT CHANGE UP (ref 3.8–10.5)
WBC # FLD AUTO: 10.44 K/UL — SIGNIFICANT CHANGE UP (ref 3.8–10.5)

## 2019-11-14 RX ADMIN — APIXABAN 2.5 MILLIGRAM(S): 2.5 TABLET, FILM COATED ORAL at 22:23

## 2019-11-14 RX ADMIN — PREGABALIN 1000 MICROGRAM(S): 225 CAPSULE ORAL at 12:03

## 2019-11-14 RX ADMIN — Medication 25 MILLIGRAM(S): at 06:35

## 2019-11-14 RX ADMIN — ATORVASTATIN CALCIUM 80 MILLIGRAM(S): 80 TABLET, FILM COATED ORAL at 22:23

## 2019-11-14 RX ADMIN — Medication 25 MILLIGRAM(S): at 18:49

## 2019-11-14 RX ADMIN — ESCITALOPRAM OXALATE 5 MILLIGRAM(S): 10 TABLET, FILM COATED ORAL at 22:22

## 2019-11-14 RX ADMIN — ESCITALOPRAM OXALATE 5 MILLIGRAM(S): 10 TABLET, FILM COATED ORAL at 08:10

## 2019-11-14 RX ADMIN — AZITHROMYCIN 255 MILLIGRAM(S): 500 TABLET, FILM COATED ORAL at 22:22

## 2019-11-14 RX ADMIN — LOSARTAN POTASSIUM 50 MILLIGRAM(S): 100 TABLET, FILM COATED ORAL at 06:35

## 2019-11-14 RX ADMIN — Medication 25 MICROGRAM(S): at 06:35

## 2019-11-14 RX ADMIN — APIXABAN 2.5 MILLIGRAM(S): 2.5 TABLET, FILM COATED ORAL at 11:11

## 2019-11-14 RX ADMIN — CEFEPIME 1000 MILLIGRAM(S): 1 INJECTION, POWDER, FOR SOLUTION INTRAMUSCULAR; INTRAVENOUS at 06:35

## 2019-11-14 RX ADMIN — Medication 2 SPRAY(S): at 12:03

## 2019-11-14 RX ADMIN — CEFEPIME 1000 MILLIGRAM(S): 1 INJECTION, POWDER, FOR SOLUTION INTRAMUSCULAR; INTRAVENOUS at 18:48

## 2019-11-14 NOTE — CONSULT NOTE ADULT - SUBJECTIVE AND OBJECTIVE BOX
HPI: Pt is a 89y old Female with hx of AFib, HTN, hypothyroid, anemia, recent stroke, ataxia   Came in through the ED on 2019 with an increased productive cough for 3 days increased lethargy and weakness since yesterday. Today patient was with her aid at home and started vomiting during her lunch. Patient is unable to provide any history due to confusion. patient daughter provides history for mother and states that she had a stroke about 3 months ago and since then has been getting weaker. Patient is in a chair most of the day and uses a stair lift to get up and down stairs. Daughter employees a private hire aide at time when she needs help.       : Patient seen and examined with daughter and  at the bedside. Patient denies pain or SOB at this time. Appears comfortable. Denies any complaints       PAIN: ( )Yes   (x )No  DYSPNEA: ( ) Yes  (x ) No      PAST MEDICAL & SURGICAL HISTORY:  Atrial fibrillation, chronic  Other iron deficiency anemia  Carpal tunnel syndrome, right  Hypothyroid  Hypertension  S/P cataract surgery, right  S/P hip replacement, left: 2009      SOCIAL HX:    Hx opiate tolerance ( )YES  (x )NO    Baseline ADLs  (Prior to Admission)  ( ) Independent   (x )Dependent    was home with a lot of assistance     FAMILY HISTORY:  unable to contribute     Review of Systems:    Unable to obtain/Limited due to: confusion     PHYSICAL EXAM:    Vital Signs Last 24 Hrs  T(C): 37.3 (2019 11:02), Max: 37.3 (2019 11:02)  T(F): 99.2 (2019 11:02), Max: 99.2 (2019 11:02)  HR: 86 (2019 11:02) (78 - 87)  BP: 96/49 (2019 11:02) (96/49 - 136/59)  BP(mean): 72 (2019 05:45) (72 - 72)  RR: 18 (2019 11:02) (16 - 18)  SpO2: 91% (2019 11:02) (91% - 99%)  Daily Weight in k.5 (2019 05:45)    PPSV2: 10-20 %    General: pleasant elderly women in bed, appears comfortable NAD  Mental Status: patient alert but confused   HEENT: dry oral mucosa + temporal wasting   Lungs: diminished b/l, no respiratory distress at this time   Cardiac: S1S2+   GI: non tender, non distended  : + voiding   Ext: no edema present      LABS:                        11.3   10.44 )-----------( 181      ( 2019 08:30 )             35.7         138  |  103  |  21  ----------------------------<  116<H>  4.5   |  30  |  0.79    Ca    8.9      2019 15:41    TPro  6.9  /  Alb  3.1<L>  /  TBili  2.0<H>  /  DBili  x   /  AST  14<L>  /  ALT  13  /  AlkPhos  71      PT/INR - ( 2019 15:41 )   PT: 15.7 sec;   INR: 1.40 ratio         PTT - ( 2019 15:41 )  PTT:32.6 sec  Albumin: Albumin, Serum: 3.1 g/dL ( @ 15:41)      Allergies    No Known Allergies    Intolerances      MEDICATIONS  (STANDING):  apixaban 2.5 milliGRAM(s) Oral every 12 hours  atorvastatin 80 milliGRAM(s) Oral at bedtime  azithromycin  IVPB      azithromycin  IVPB 500 milliGRAM(s) IV Intermittent every 24 hours  cefepime  Injectable. 1000 milliGRAM(s) IV Push every 12 hours  cyanocobalamin 1000 MICROGram(s) Oral daily  escitalopram 5 milliGRAM(s) Oral <User Schedule>  fluticasone propionate 50 MICROgram(s)/spray Nasal Spray 2 Spray(s) Both Nostrils daily  levothyroxine 25 MICROGram(s) Oral daily  losartan 50 milliGRAM(s) Oral daily  metoprolol succinate ER 25 milliGRAM(s) Oral two times a day    MEDICATIONS  (PRN):    RADIOLOGY/ADDITIONAL STUDIES:  < from: CT Chest w/ IV Cont (19 @ 17:55) >  EXAM:  CT CHEST IC                            PROCEDURE DATE:  2019      INTERPRETATION:  CLINICAL INFORMATION: Cough jaundice, right upper   quadrant pain    COMPARISON: Noncontrast CT chest 2018    PROCEDURE:   CT of the Chest, Abdomen and Pelvis was performed with intravenous   contrast.   Intravenous contrast: 90 ml Omnipaque 350. 10 ml discarded.  Oral contrast: None.  Sagittal and coronal reformats were performed.    FINDINGS:    CHEST:     LUNGS AND LARGE AIRWAYS: Patent central airways. Patchy left lower lobe   atelectasis/infiltrate. Correlate clinically for infection..  PLEURA: Trace right small left pleural effusion.  VESSELS: Nonaneurysmal.  HEART: enlarged. No pericardial effusion.  MEDIASTINUM AND ALISON: 2 cm necrotic left hilar mass/adenopathy likely   neoplastic..  CHEST WALL AND LOWER NECK: Within normal limits.    ABDOMEN AND PELVIS:    LIVER: A 1.5 cm hypodense lesion with mild peripheral enhancement in the   medial segment left hepatic lobe and a 4.3 cmperipherally enhancing   lesion in the right hepatic lobe may represent hemangiomas and/or   metastatic deposits. Correlate with dedicated hepatic MRI..  BILE DUCTS: Normal caliber.  GALLBLADDER: Within normal limits.  SPLEEN: Within normal limits.  PANCREAS: Within normal limits.  ADRENALS: Within normal limits.  KIDNEYS/URETERS: Within normal limits.    BLADDER: Within normal limits.  REPRODUCTIVE ORGANS: Atrophic postmenopausal uterus with calcified   degenerating fibroid. A 2.3 cm left adnexal cyst. Correlate with pelvic   ultrasound    BOWEL: No bowel obstruction or inflammation. Moderate rectal fecal   impaction.. Appendix no appendicitis  PERITONEUM: No ascites.  VESSELS: Atherosclerotic nonaneurysmal.  RETROPERITONEUM/LYMPH NODES: No lymphadenopathy.    ABDOMINAL WALL: Small fat-containing umbilical hernia.  BONES: Spinal degenerative change. No acute or aggressive pathology.    IMPRESSION:     Necrotic left hilar mass/adenopathy likely neoplastic.  Patchy infiltrate/atelectasis left lower lobe. Correlate clinically for   active infection.  Trace right small left pleural effusion.  Indeterminate hypodense hepatic lesions. Metastatic disease not excluded.   Correlate with MR.  Additional findings as discussed      < end of copied text >  < from: Xray Chest 1 View AP/PA. (19 @ 17:08) >  EXAM:  XR CHEST 1 VIEW                            PROCEDURE DATE:  2019          INTERPRETATION:  Exam Date: 2019 5:08 PM    History: Cough    Technique: Single frontal portable view of the chest with comparison to    2019    Findings:    The heart is enlarged. Question left pleural effusion and/or left basilar   atelectasis, repeat PA and lateral films can be obtained as indicated.   The apices and hemidiaphragms are unremarkable. Degenerative changes of   the visualized osseous structures.        Impression:    Question left pleural effusion and/or left basilar atelectasis, repeat PA   and lateral films can be obtained as indicated    < end of copied text >

## 2019-11-14 NOTE — PROGRESS NOTE ADULT - SUBJECTIVE AND OBJECTIVE BOX
History of Present Illness: 	  89 yo female with a PMHx of afib, HTN, hypothyroid, anemia, recent stroke, ataxia presents to the ED with an increased productive cough for 3 days increased lethargy and weakness since yesterday. Today patient was with her aid at home and started vomiting during her lunch. No fever or diarrhea. EMS placed O2 because patient was very lethargic. Patient is unable to provide any history due to her baseline status(like confusion). Her daughter at the bedside provided the history. As per the daughter, patient is having cough with productive sputum over 1 week. But she had no fever. She was also looking very weak for the last 1 week. She was not eating properly for the last 2-3 days. Usually she is able to eat all types of food. But today she had vomiting during lunch time and became more lethargic. At that time, ambulance was called in. Nonsmoker, no ETOH, no drugs.     11/14: Seen and eval. Hemodynamically stable. Denies any HA, CP, SOB. Patient on an imaging has high suspicion for malignancy. Daughter understands -  no aggressive interventions. Palliative care eval. Family does not wants patient know about her her CT findings besides pneumonia.     REVIEW OF SYSTEMS:  Poor clinical historian.     Physical Exam:   GENERAL APPEARANCE:  NAD, hemodynamically stable  T(C): 37.3 (11-14-19 @ 11:02), Max: 37.3 (11-14-19 @ 11:02)  HR: 86 (11-14-19 @ 11:02) (75 - 87)  BP: 96/49 (11-14-19 @ 11:02) (96/49 - 136/59)  RR: 18 (11-14-19 @ 11:02) (16 - 19)  SpO2: 91% (11-14-19 @ 11:02) (91% - 99%)  Wt(kg): --  HEENT: elderly, deconditioned and frail    Skin:  Warm and dry without any rash   NECK:  Supple without lymphadenopathy.   HEART:  Regular rate and rhythm. normal S1 and S2, No M/R/G  LUNGS:  Good ins/exp effort, no W/R/R/C  ABDOMEN:  Soft, nontender, nondistended with good bowel sounds heard  EXTREMITIES:  Without cyanosis, clubbing or edema.   NEUROLOGICAL:  Gross nonfocal       CBC Full  -  ( 14 Nov 2019 08:30 )  WBC Count : 10.44 K/uL  RBC Count : 3.87 M/uL  Hemoglobin : 11.3 g/dL  Hematocrit : 35.7 %  Platelet Count - Automated : 181 K/uL  Mean Cell Volume : 92.2 fl  Mean Cell Hemoglobin : 29.2 pg  Mean Cell Hemoglobin Concentration : 31.7 gm/dL  Auto Neutrophil # : x  Auto Lymphocyte # : x  Auto Monocyte # : x  Auto Eosinophil # : x  Auto Basophil # : x  Auto Neutrophil % : x  Auto Lymphocyte % : x  Auto Monocyte % : x  Auto Eosinophil % : x  Auto Basophil % : x    PT/INR - ( 13 Nov 2019 15:41 )   PT: 15.7 sec;   INR: 1.40 ratio         PTT - ( 13 Nov 2019 15:41 )  PTT:32.6 sec    11-13    138  |  103  |  21  ----------------------------<  116<H>  4.5   |  30  |  0.79    Ca    8.9      13 Nov 2019 15:41    TPro  6.9  /  Alb  3.1<L>  /  TBili  2.0<H>  /  DBili  x   /  AST  14<L>  /  ALT  13  /  AlkPhos  71  11-13      # Suspected post obstructive pneumonia in the setting of abnormal imaging findgins  - CT scan: Necrotic left hilar mass/adenopathy likely neoplastic. Patchy infiltrate/atelectasis left lower lobe. Correlate clinically for   active infection. Trace right small left pleural effusion. Indeterminate hypodense hepatic lesions. Metastatic disease not excluded.   Correlate with MR.Additional findings as discussed.   - continue IV cefepime and Zithromax  - follow ID consult for vancomycin dosing.   - family aware of CT findings -  no aggressive interventions  - poor long term prognosis  - care discussed with Dr. Moses    # Chronic A fib  - HR controlled  - continue metoprolol and Eliquis    # HTN  -stable  -continue current medications    # Hypothyroidism  -continue Levothyroxine    # Lesion in Liver  -?hemangioma vs malignancy   -can be followed in outpatient setting  -patient daughter agreed with the plan     # Code status: Full code.     - palliative care evaluation for GOC discussion

## 2019-11-14 NOTE — CONSULT NOTE ADULT - SUBJECTIVE AND OBJECTIVE BOX
Patient is a 89y old  Female who presents with a chief complaint of complain of cough and lethargy (2019 12:08)    HPI:  89 yo female with a PMHx of afib, HTN, hypothyroid, anemia, recent stroke, ataxia presents to the ED with an increased productive cough for 3 days increased lethargy and weakness since yesterday. Today patient was with her aid at home and started vomiting during her lunch. No fever or diarrhea. EMS placed O2 because patient was very lethargic. Patient is unable to provide any history due to her baseline status(like confusion). Her daughter at the bedside provided the history. As per the daughter, patient is having cough with productive sputum over 1 week. But she had no fever. She was also looking very weak for the last 1 week. She was not eating properly for the last 2-3 days. Usually she is able to eat all types of food. But today she had vomiting during lunch time and became more lethargic. At that time, ambulance was called in. Nonsmoker, no ETOH, no drugs.       PMH: as above  PSH: as above  Meds: per reconciliation sheet, noted below  MEDICATIONS  (STANDING):  apixaban 2.5 milliGRAM(s) Oral every 12 hours  atorvastatin 80 milliGRAM(s) Oral at bedtime  azithromycin  IVPB      azithromycin  IVPB 500 milliGRAM(s) IV Intermittent every 24 hours  cefepime  Injectable. 1000 milliGRAM(s) IV Push every 12 hours  cyanocobalamin 1000 MICROGram(s) Oral daily  escitalopram 5 milliGRAM(s) Oral <User Schedule>  fluticasone propionate 50 MICROgram(s)/spray Nasal Spray 2 Spray(s) Both Nostrils daily  levothyroxine 25 MICROGram(s) Oral daily  losartan 50 milliGRAM(s) Oral daily  metoprolol succinate ER 25 milliGRAM(s) Oral two times a day      Allergies    No Known Allergies    Intolerances      Social: no smoking, no alcohol, no illegal drugs; no recent travel, no exposure to TB  FAMILY HISTORY:     no history of premature cardiovascular disease in first degree relatives    ROS:  no HA, no dizziness, no sore throat, no blurry vision, no CP, no palpitations, no abdominal pain, no diarrhea, no N/V, no dysuria, no leg pain, no claudication, no rash, no joint aches, no rectal pain or bleeding, no night sweats  All other systems reviewed and are negative    Vital Signs Last 24 Hrs  T(C): 37.3 (2019 11:02), Max: 37.3 (2019 11:02)  T(F): 99.2 (:02), Max: 99.2 (2019 11:02)  HR: 86 (2019 11:02) (75 - 87)  BP: 96/49 (2019 11:02) (96/49 - 136/59)  BP(mean): 72 (2019 05:45) (72 - 72)  RR: 18 (:) (16 - 19)  SpO2: 91% (:) (91% - 99%)  Daily Height in cm: 160.02 (2019 15:10)    Daily Weight in k.5 (2019 05:45)    PE:  Constitutional: frail looking  HEENT: NC/AT, EOMI, PERRLA, conjunctivae clear; ears and nose atraumatic; pharynx benign  Neck: supple; thyroid not palpable  Back: no tenderness  Respiratory: respiratory effort normal; clear to auscultation  Cardiovascular: S1S2 regular, no murmurs  Abdomen: soft, not tender, not distended, positive BS; liver and spleen WNL  Genitourinary: no suprapubic tenderness  Lymphatic: no LN palpable  Musculoskeletal: no muscle tenderness, no joint swelling or tenderness  Extremities: no pedal edema  Neurological/ Psychiatric:  moving all extremities  Skin: no rashes; no palpable lesions    Labs: all available labs reviewed                        11.3   10.44 )-----------( 181      ( 2019 08:30 )             35.7     11-13    138  |  103  |  21  ----------------------------<  116<H>  4.5   |  30  |  0.79    Ca    8.9      2019 15:41    TPro  6.9  /  Alb  3.1<L>  /  TBili  2.0<H>  /  DBili  x   /  AST  14<L>  /  ALT  13  /  AlkPhos  71  11-13     LIVER FUNCTIONS - ( 2019 15:41 )  Alb: 3.1 g/dL / Pro: 6.9 gm/dL / ALK PHOS: 71 U/L / ALT: 13 U/L / AST: 14 U/L / GGT: x                 Radiology: all available radiological tests reviewed    Advanced directives addressed: full resuscitation Patient is a 89y old  Female who presents with a chief complaint of complain of cough and lethargy (2019 12:08)    HPI:  89 yo female with a PMHx of afib, HTN, hypothyroid, anemia, recent stroke, ataxia presents to the ED with an increased productive cough for 3 days increased lethargy and weakness since . patient was with her aid at home and started vomiting during her lunch. No fever or diarrhea. EMS placed O2 because patient was very lethargic. Patient is unable to provide any history due to her baseline status(like confusion). Her daughter at the bedside provided the history. As per the daughter, patient is having cough with productive sputum over 1 week. But she had no fever. She was also looking very weak for the last 1 week. She was not eating properly for the last 2-3 days. Usually she is able to eat all types of food. But today she had vomiting during lunch time and became more lethargic. At that time, ambulance was called in. Here wbc ct 13, CT chest with LLL PNA, L hilar mass? suspected malignancy ? liver lesion was given IV vanco/cefepime/azithro.        PMH: as above  PSH: as above  Meds: per reconciliation sheet, noted below  MEDICATIONS  (STANDING):  apixaban 2.5 milliGRAM(s) Oral every 12 hours  atorvastatin 80 milliGRAM(s) Oral at bedtime  azithromycin  IVPB      azithromycin  IVPB 500 milliGRAM(s) IV Intermittent every 24 hours  cefepime  Injectable. 1000 milliGRAM(s) IV Push every 12 hours  cyanocobalamin 1000 MICROGram(s) Oral daily  escitalopram 5 milliGRAM(s) Oral <User Schedule>  fluticasone propionate 50 MICROgram(s)/spray Nasal Spray 2 Spray(s) Both Nostrils daily  levothyroxine 25 MICROGram(s) Oral daily  losartan 50 milliGRAM(s) Oral daily  metoprolol succinate ER 25 milliGRAM(s) Oral two times a day      Allergies    No Known Allergies    Intolerances      Social: no smoking, no alcohol, no illegal drugs; no recent travel, no exposure to TB  FAMILY HISTORY:     no history of premature cardiovascular disease in first degree relatives    ROS:  no HA, no dizziness, no sore throat, no blurry vision, no CP, no palpitations, no abdominal pain, no diarrhea, no N/V, no dysuria, no leg pain, no claudication, no rash, no joint aches, no rectal pain or bleeding, no night sweats  All other systems reviewed and are negative    Vital Signs Last 24 Hrs  T(C): 37.3 (2019 11:02), Max: 37.3 (2019 11:02)  T(F): 99.2 (2019 11:02), Max: 99.2 (2019 11:02)  HR: 86 (:) (75 - 87)  BP: 96/49 (:) (96/49 - 136/59)  BP(mean): 72 (2019 05:45) (72 - 72)  RR: 18 (:) (16 - 19)  SpO2: 91% (:) (91% - 99%)  Daily Height in cm: 160.02 (2019 15:10)    Daily Weight in k.5 (2019 05:45)    PE:  Constitutional: frail looking  HEENT: NC/AT, EOMI, PERRLA, conjunctivae clear; ears and nose atraumatic; pharynx benign  Neck: supple; thyroid not palpable  Back: no tenderness  Respiratory: decreased breath sounds   Cardiovascular: S1S2 regular, no murmurs  Abdomen: soft, not tender, not distended, positive BS; liver and spleen WNL  Genitourinary: no suprapubic tenderness  Lymphatic: no LN palpable  Musculoskeletal: no muscle tenderness, no joint swelling or tenderness  Extremities: no pedal edema  Neurological/ Psychiatric:  moving all extremities  Skin: no rashes; no palpable lesions    Labs: all available labs reviewed                        11.3   1044 )-----------( 181      ( 2019 08:30 )             35.7     11-13    138  |  103  |  21  ----------------------------<  116<H>  4.5   |  30  |  0.79    Ca    8.9      2019 15:41    TPro  6.9  /  Alb  3.1<L>  /  TBili  2.0<H>  /  DBili  x   /  AST  14<L>  /  ALT  13  /  AlkPhos  71  11-13     LIVER FUNCTIONS - ( 2019 15:41 )  Alb: 3.1 g/dL / Pro: 6.9 gm/dL / ALK PHOS: 71 U/L / ALT: 13 U/L / AST: 14 U/L / GGT: x                 Radiology: all available radiological tests reviewed    Advanced directives addressed: full resuscitation

## 2019-11-14 NOTE — CONSULT NOTE ADULT - ASSESSMENT
Pt is a 89y old Female with hx of AFib, HTN, hypothyroid, anemia, recent stroke, ataxia     Came in through the ED on 11/13/2019 with an increased productive cough for 3 days increased lethargy and weakness since yesterday. Today patient was with her aid at home and started vomiting during her lunch. Patient is unable to provide any history due to confusion. patient daughter provides history for mother and states that she had a stroke about 3 months ago and since then has been getting weaker. Patient is in a chair most of the day and uses a stair lift to get up and down stairs. Daughter employees a private hire aide at time when she needs help.       1) Debility   - Progressively getting worse as per Daughter   - PPSV2: 10-20 %    2) possible Metastatic Cancer   - family aware of results and would not like any aggressive interventions   - CT scan: Necrotic left hilar mass/adenopathy likely neoplastic. Indeterminate hypodense hepatic lesions. Metastatic disease not excluded.       3) Suspected post obstructive pneumonia   - CT scan: Patchy infiltrate/atelectasis left lower lobe, Trace right small left pleural effusion.   - continue IV cefepime and Zithromax  - Antibiotics as Per ID   - poor long term prognosis    4) Chronic A fib  - HR controlled  - continue metoprolol and Eliquis    5) Advance directives   - patient does not have capacity to make medical decisions   - Daughter surrogate at bedside GOC conversation on 11/14/2019 - please refer to note
89 yo female with a PMHx of afib, HTN, hypothyroid, anemia, recent stroke, ataxia presents to the ED with an increased productive cough for 3 days increased lethargy and weakness since 11/12. patient was with her aid at home and started vomiting during her lunch. No fever or diarrhea. EMS placed O2 because patient was very lethargic. Patient is unable to provide any history due to her baseline status(like confusion). Her daughter at the bedside provided the history. As per the daughter, patient is having cough with productive sputum over 1 week. But she had no fever. She was also looking very weak for the last 1 week. She was not eating properly for the last 2-3 days. Usually she is able to eat all types of food. But today she had vomiting during lunch time and became more lethargic. At that time, ambulance was called in. Here wbc ct 13, CT chest with LLL PNA, L hilar mass? suspected malignancy ? liver lesion was given IV vanco/cefepime/azithro.    1. LLL pneumonia. ? aspiration. leukocytosis. hx CVA  - wbc ct normalized, improving  - aspiration precautions  - agree with IV cefepime [id number]  - agree with IV azithromycin 500 mg daily   - fu cultures  - tolerating abx well so far; no side effects noted  - reason for abx use and side effects reviewed with patient  - supportive care  - fu cbc    2. other issues - care per medicine

## 2019-11-14 NOTE — GOALS OF CARE CONVERSATION - ADVANCED CARE PLANNING - CONVERSATION DETAILS
Met with patients daughter Liz and Pastor William Ag, reviewed palliative medicine.  Liz explained that patient had a stroke three months ago and progressively gotten worse but over the past week decreased appetite and notices profound weakness. Daughter and mother are the only family members that they each have. Explained that he brother  at age 25.  Patient   years ago and then Liz's  also got sick and passed away. So they have both had multiple loses in there life time and daughter feels the burden as the only caretaker for her mother.     Discussed disease progression and stated that she knows that patient is very sick and wants to take her home. She would not want to treat her for cancer, explained with that definitive bx could not say that is what it is absolutely, but that there is a strong suspicion. Daughter really was hoping to hear more of a time frame but explained that is unable to give her that at this time.     Discussed different levels of home care and patients daughter things she is interested at this time in Hospice. At this time patients daughter was very sad and overwhelmed so instead of going any further just offered emotional will have another meeting tomorrow 10am

## 2019-11-15 LAB
-  AMIKACIN: SIGNIFICANT CHANGE UP
-  AMPICILLIN/SULBACTAM: SIGNIFICANT CHANGE UP
-  AMPICILLIN: SIGNIFICANT CHANGE UP
-  AZTREONAM: SIGNIFICANT CHANGE UP
-  CEFAZOLIN: SIGNIFICANT CHANGE UP
-  CEFEPIME: SIGNIFICANT CHANGE UP
-  CEFOXITIN: SIGNIFICANT CHANGE UP
-  CEFTRIAXONE: SIGNIFICANT CHANGE UP
-  CIPROFLOXACIN: SIGNIFICANT CHANGE UP
-  GENTAMICIN: SIGNIFICANT CHANGE UP
-  IMIPENEM: SIGNIFICANT CHANGE UP
-  LEVOFLOXACIN: SIGNIFICANT CHANGE UP
-  MEROPENEM: SIGNIFICANT CHANGE UP
-  NITROFURANTOIN: SIGNIFICANT CHANGE UP
-  PIPERACILLIN/TAZOBACTAM: SIGNIFICANT CHANGE UP
-  TIGECYCLINE: SIGNIFICANT CHANGE UP
-  TOBRAMYCIN: SIGNIFICANT CHANGE UP
-  TRIMETHOPRIM/SULFAMETHOXAZOLE: SIGNIFICANT CHANGE UP
CULTURE RESULTS: SIGNIFICANT CHANGE UP
LEGIONELLA AG UR QL: NEGATIVE — SIGNIFICANT CHANGE UP
METHOD TYPE: SIGNIFICANT CHANGE UP
ORGANISM # SPEC MICROSCOPIC CNT: SIGNIFICANT CHANGE UP
ORGANISM # SPEC MICROSCOPIC CNT: SIGNIFICANT CHANGE UP
S PNEUM AG UR QL: NEGATIVE — SIGNIFICANT CHANGE UP
SPECIMEN SOURCE: SIGNIFICANT CHANGE UP

## 2019-11-15 RX ADMIN — CEFEPIME 1000 MILLIGRAM(S): 1 INJECTION, POWDER, FOR SOLUTION INTRAMUSCULAR; INTRAVENOUS at 17:44

## 2019-11-15 RX ADMIN — CEFEPIME 1000 MILLIGRAM(S): 1 INJECTION, POWDER, FOR SOLUTION INTRAMUSCULAR; INTRAVENOUS at 06:42

## 2019-11-15 RX ADMIN — ESCITALOPRAM OXALATE 5 MILLIGRAM(S): 10 TABLET, FILM COATED ORAL at 22:33

## 2019-11-15 RX ADMIN — APIXABAN 2.5 MILLIGRAM(S): 2.5 TABLET, FILM COATED ORAL at 22:34

## 2019-11-15 RX ADMIN — ATORVASTATIN CALCIUM 80 MILLIGRAM(S): 80 TABLET, FILM COATED ORAL at 22:33

## 2019-11-15 RX ADMIN — Medication 25 MILLIGRAM(S): at 17:43

## 2019-11-15 RX ADMIN — APIXABAN 2.5 MILLIGRAM(S): 2.5 TABLET, FILM COATED ORAL at 10:53

## 2019-11-15 RX ADMIN — PREGABALIN 1000 MICROGRAM(S): 225 CAPSULE ORAL at 12:44

## 2019-11-15 RX ADMIN — AZITHROMYCIN 255 MILLIGRAM(S): 500 TABLET, FILM COATED ORAL at 22:33

## 2019-11-15 RX ADMIN — Medication 2 SPRAY(S): at 12:44

## 2019-11-15 NOTE — DIETITIAN INITIAL EVALUATION ADULT. - PHYSICAL APPEARANCE
underweight/other (specify) NFPE: Subcutaneous fat loss: moderate (orbital, buccal, triceps)   Muscle wasting: moderate (temporal, clavicle, deltoid, interosseous) severe: ( thigh, calf)  [place]: 12, Skin: intact, Edema: no edema present NFPE: Subcutaneous fat loss: moderate/severe (orbital, buccal, triceps)   Muscle wasting: moderate/several  (temporal, clavicle, deltoid, interosseous) severe: ( thigh, calf)  [place]: 12, Skin: intact, Edema: no edema present

## 2019-11-15 NOTE — DIETITIAN INITIAL EVALUATION ADULT. - ADD RECOMMEND
1)Add Ensure Enlive BID, 2) Weekly weights 3) MVI 1)Add Ensure Enlive BID, 2) Weekly weights 3) MVI 4) Record PO intake in EMR after each meal (nursing.) Monitor PO intake, tolerance, labs and weight.

## 2019-11-15 NOTE — SWALLOW BEDSIDE ASSESSMENT ADULT - ADDITIONAL RECOMMENDATIONS
1) NEURO F/U    2) NUTRITION F/U    3) TALK LOUDLY TO PATIENT IN FACE TO FACE DYADS AS SHE IS VERY [place].

## 2019-11-15 NOTE — SWALLOW BEDSIDE ASSESSMENT ADULT - PHARYNGEAL PHASE
Swallow trigger was timely to mildly latent. Laryngeal lift on palpation during swallowing trials was mildly+ reduced but felt to be functional with several of the above modified PO types. Post prandial coughing was demonstrated with thin liquids only despite cues to employ compensatory swallowing maneuvers. NO behavioral aspiration signs demonstrated with nectar thick liquids, pureed foods and mechanical soft solids.

## 2019-11-15 NOTE — SWALLOW BEDSIDE ASSESSMENT ADULT - COMMENTS
The patient was admitted to . Hospital course is notable for UTI, left pneumonia and witnessed coughing when drinking water. Note that pt was hospitalized at this facility in 9/19 with altered mentation/Dysarthria/Dysphagia/left sided weakness. Imaging of brain at the time was notable for a right Parietal infarct. She was stable on a Dysphagia diet at the time and she also received in house speech path intervention at the time. Moreover, the patient completed a course of outpatient home care speech pathology intervention and was eventually D/C from program when maximizing therapy potential. This profile is superimposed upon a prior history of Dementia, A-Fib, HTN, hypothyroidism, iron deficiency anemia, prior right carpal tunnel release, past right cataract extraction and previous left THR. The patient was admitted to . Hospital course is notable for a UTI, left pneumonia and witnessed coughing when drinking water. Note that pt was hospitalized at this facility in 9/19 with altered mentation/Dysarthria/Dysphagia/left sided weakness. Imaging of brain at the time was notable for a right Parietal infarct. She was stable on a dysphagia diet at the time and she also received in house speech path intervention at the time. Moreover, the patient completed a course of outpatient home care speech pathology intervention and was eventually D/C from program when maximizing therapy potential. This profile is superimposed upon a prior history of Dementia, A-Fib, HTN, hypothyroidism, iron deficiency anemia, prior right carpal tunnel release, past right cataract extraction and previous left THR.

## 2019-11-15 NOTE — GOALS OF CARE CONVERSATION - ADVANCED CARE PLANNING - NS PRO AD PATIENT TYPE
The topic of DNR/DNI discussed with daughter and RN-further discussion to be done by MD/Health Care Proxy (HCP)
Health Care Proxy (HCP)/Do Not Resuscitate (DNR)/The topic of DNR/DNI discussed with daughter and RN-further discussion to be done by MD/Medical Orders for Life-Sustaining Treatment (MOLST)
Health Care Proxy (HCP)/The topic of DNR/DNI discussed with daughter and RN-further discussion to be done by MD

## 2019-11-15 NOTE — SWALLOW BEDSIDE ASSESSMENT ADULT - ASR SWALLOW LINGUAL MOBILITY
Unable to assess as pt was unable to follow task directives which appeared to be largely due to reduced hearing acuity. Unable to assess as pt was unable to follow task directives which appeared to be largely due to reduced hearing acuity/altered cognition.

## 2019-11-15 NOTE — PROGRESS NOTE ADULT - ASSESSMENT
Pt is a 89y old Female with hx of AFib, HTN, hypothyroid, anemia, recent stroke, ataxia     Came in through the ED on 11/13/2019 with an increased productive cough for 3 days increased lethargy and weakness since yesterday. Today patient was with her aid at home and started vomiting during her lunch. Patient is unable to provide any history due to confusion. patient daughter provides history for mother and states that she had a stroke about 3 months ago and since then has been getting weaker. Patient is in a chair most of the day and uses a stair lift to get up and down stairs. Daughter employees a private hire aide at time when she needs help.       1) Debility   - Progressively getting worse as per Daughter   - PPSV2: 10-20 %    2) possible Metastatic Cancer   - family aware of results and would not like any aggressive interventions   - CT scan: Necrotic left hilar mass/adenopathy likely neoplastic. Indeterminate hypodense hepatic lesions. Metastatic disease not excluded.     3) Suspected post obstructive pneumonia   - CT scan: Patchy infiltrate/atelectasis left lower lobe, Trace right small left pleural effusion.   - continue IV cefepime and Zithromax  - Antibiotics as Per ID   - poor long term prognosis    4) Chronic A fib  - HR controlled  - continue metoprolol and Eliquis    5) Advance directives   - patient does not have capacity to make medical decisions   - Daughter surrogate at bedside GOC conversation on 11/14/2019 and 11/15/2019 - please refer to note     spoke with SW to make referral to [facility]

## 2019-11-15 NOTE — CHART NOTE - NSCHARTNOTEFT_GEN_A_CORE
Upon Nutritional Assessment by the Registered Dietitian your patient was determined to meet criteria / has evidence of the following diagnosis/diagnoses:          [ ]  Mild Protein Calorie Malnutrition        [ ]  Moderate Protein Calorie Malnutrition        [ x] Severe Protein Calorie Malnutrition        [ ] Unspecified Protein Calorie Malnutrition        [ ] Underweight / BMI <19        [ ] Morbid Obesity / BMI > 40      Findings as based on:  •  Comprehensive nutrition assessment and consultation  •  Calorie counts (nutrient intake analysis)  •  Food acceptance and intake status from observations by staff  •  Follow up  •  Patient education  •  Intervention secondary to interdisciplinary rounds  •   concerns    Pt meets criteria for severe protein-calorie malnutrition in context of chronic disease   NFPE: Subcutaneous fat loss: moderate/severe (orbital, buccal, triceps)   Muscle wasting: moderate/several  (temporal, clavicle, deltoid, interosseous) severe: ( thigh, calf)  PO intake < 75% nutritional needs > one month  Wt loss of 9% UBW in 2 months (clinically significant)                Treatment:    The following diet has been recommended:  Maintain diet dysphagia 2 with NT liquids  add Ensure Enlive 8 oz bid  Record PO intake in EMR after each meal (nursing.)   Encourage PO intake.   Monitor PO intake, tolerance, labs and weight.   Meal preferences noted      PROVIDER Section:     By signing this assessment you are acknowledging and agree with the diagnosis/diagnoses assigned by the Registered Dietitian    Comments:

## 2019-11-15 NOTE — SWALLOW BEDSIDE ASSESSMENT ADULT - SWALLOW EVAL: DIAGNOSIS
1) Feeding integrity is hindered by chronic LUE weakness & a head tremor which are pre-existing. This is atop Oropharyngeal Dysphagia which subjectively appeared to be a grossly functional condition with a restricted inventory of modified food textures. Overt aspiration signs noted with thin liquids only. Dysphagia is pre-existing in setting of a CVA sustained in 9/19 as well as underlying muscle wasting.  She previously completed swallow therapy for existing deficits. Swallow integrity is maximized. 2)  Pt is alert/interactive but distractible & [place]. She was able to verbalize during communicative probes. At these times, her speech output was mildly slurred c/w Dysarthria & she had difficulty modulating her vocal intensity due to hearing loss so she spoke at an excessively loud volume. Her underlying utterances were linguistically intact but reflective of baseline short term memory/cognitive deficits. Effects of prior right Parietal CVA/Dementia are causal.

## 2019-11-15 NOTE — DIETITIAN INITIAL EVALUATION ADULT. - DIET TYPE
Dysphagia 2 Mechanical Soft-Nectar Consistency Fluid dysphagia 2, mechanical soft, nectar consistency fld/Add Ensure Enlive BID

## 2019-11-15 NOTE — PROGRESS NOTE ADULT - SUBJECTIVE AND OBJECTIVE BOX
Date of service: 11-15-19 @ 10:49    pt seen and examined  sitting up in bed  vomiting o/n ? aspiration  better this am, afebrile     ROS: no fever or chills; denies dizziness, no HA, no abdominal pain, no diarrhea or constipation; no dysuria, no urinary frequency, no legs pain, no rashes    MEDICATIONS  (STANDING):  apixaban 2.5 milliGRAM(s) Oral every 12 hours  atorvastatin 80 milliGRAM(s) Oral at bedtime  azithromycin  IVPB      azithromycin  IVPB 500 milliGRAM(s) IV Intermittent every 24 hours  cefepime  Injectable. 1000 milliGRAM(s) IV Push every 12 hours  cyanocobalamin 1000 MICROGram(s) Oral daily  escitalopram 5 milliGRAM(s) Oral <User Schedule>  fluticasone propionate 50 MICROgram(s)/spray Nasal Spray 2 Spray(s) Both Nostrils daily  levothyroxine 25 MICROGram(s) Oral daily  losartan 50 milliGRAM(s) Oral daily  metoprolol succinate ER 25 milliGRAM(s) Oral two times a day      Vital Signs Last 24 Hrs  T(C): 36.9 (15 Nov 2019 08:23), Max: 37.3 (14 Nov 2019 11:02)  T(F): 98.4 (15 Nov 2019 08:23), Max: 99.2 (14 Nov 2019 11:02)  HR: 111 (15 Nov 2019 08:23) (82 - 111)  BP: 154/86 (15 Nov 2019 08:23) (96/49 - 154/86)  BP(mean): 83 (15 Nov 2019 05:15) (83 - 83)  RR: 18 (15 Nov 2019 08:23) (17 - 18)  SpO2: 94% (15 Nov 2019 08:23) (91% - 95%)        PE:  Constitutional: frail looking  HEENT: NC/AT, EOMI, PERRLA, conjunctivae clear; ears and nose atraumatic; pharynx benign  Neck: supple; thyroid not palpable  Back: no tenderness  Respiratory: decreased breath sounds   Cardiovascular: S1S2 regular, no murmurs  Abdomen: soft, not tender, not distended, positive BS; liver and spleen WNL  Genitourinary: no suprapubic tenderness  Lymphatic: no LN palpable  Musculoskeletal: no muscle tenderness, no joint swelling or tenderness  Extremities: no pedal edema  Neurological/ Psychiatric:  moving all extremities  Skin: no rashes; no palpable lesions    Labs: all available labs reviewed                                   11.3   10.44 )-----------( 181      ( 14 Nov 2019 08:30 )             35.7     11-13    138  |  103  |  21  ----------------------------<  116<H>  4.5   |  30  |  0.79    Ca    8.9      13 Nov 2019 15:41    TPro  6.9  /  Alb  3.1<L>  /  TBili  2.0<H>  /  DBili  x   /  AST  14<L>  /  ALT  13  /  AlkPhos  71  11-13         Cultures:   Culture - Blood (11.13.19 @ 17:24)    Specimen Source: .Blood None    Culture Results:   No growth to date.    Culture - Urine (11.13.19 @ 16:52)    Specimen Source: .Urine None    Culture Results:   >100,000 CFU/ml Gram Negative Rods          Radiology: all available radiological tests reviewed    Advanced directives addressed: DNR/DNI

## 2019-11-15 NOTE — PROGRESS NOTE ADULT - SUBJECTIVE AND OBJECTIVE BOX
INTERVAL HPI/OVERNIGHT EVENTS:  89 yo female with a PMHx of afib, HTN, hypothyroid, anemia, recent stroke, ataxia presents to the ED with an increased productive cough for 3 days increased lethargy and weakness since yesterday. Today patient was with her aid at home and started vomiting during her lunch. No fever or diarrhea. EMS placed O2 because patient was very lethargic. Patient is unable to provide any history due to her baseline status(like confusion). Her daughter at the bedside provided the history. As per the daughter, patient is having cough with productive sputum over 1 week. But she had no fever. She was also looking very weak for the last 1 week. She was not eating properly for the last 2-3 days. Usually she is able to eat all types of food. But today she had vomiting during lunch time and became more lethargic. At that time, ambulance was called in. Nonsmoker, no ETOH, no drugs.     11/14: Seen and eval. Hemodynamically stable. Denies any HA, CP, SOB. Patient on an imaging has high suspicion for malignancy. Daughter understands -  no aggressive interventions. Palliative care eval. Family does not wants patient know about her CT findings besides pneumonia.   11/15/19- Patient seen and examined at bedside. States she feels well, denies any CP, SOB. Daughter updated, answered all questions. Spoke with Palliative, patient likely to go home on home hospice.    MEDICATIONS  (STANDING):  apixaban 2.5 milliGRAM(s) Oral every 12 hours  atorvastatin 80 milliGRAM(s) Oral at bedtime  azithromycin  IVPB      azithromycin  IVPB 500 milliGRAM(s) IV Intermittent every 24 hours  cefepime  Injectable. 1000 milliGRAM(s) IV Push every 12 hours  cyanocobalamin 1000 MICROGram(s) Oral daily  escitalopram 5 milliGRAM(s) Oral <User Schedule>  fluticasone propionate 50 MICROgram(s)/spray Nasal Spray 2 Spray(s) Both Nostrils daily  levothyroxine 25 MICROGram(s) Oral daily  losartan 50 milliGRAM(s) Oral daily  metoprolol succinate ER 25 milliGRAM(s) Oral two times a day    MEDICATIONS  (PRN):      Allergies    No Known Allergies    Intolerances      ROS:  CONSTITUTIONAL: No weakness, fevers or chills  EYES/ENT: No visual changes;  No vertigo or throat pain   NECK: No pain or stiffness  RESPIRATORY: No cough, wheezing, hemoptysis  CARDIOVASCULAR: No chest pain or palpitations  GASTROINTESTINAL: No abdominal or epigastric pain. No nausea, vomiting, or hematemesis  GENITOURINARY: No dysuria, frequency or hematuria  NEUROLOGICAL: No numbness or weakness  SKIN: No itching, burning, rashes, or lesions   All other review of systems is negative unless indicated above.    Vital Signs Last 24 Hrs  T(C): 36.6 (15 Nov 2019 12:03), Max: 37.2 (15 Nov 2019 05:15)  T(F): 97.8 (15 Nov 2019 12:03), Max: 98.9 (15 Nov 2019 05:15)  HR: 94 (15 Nov 2019 17:33) (82 - 111)  BP: 149/56 (15 Nov 2019 17:33) (113/63 - 154/86)  BP(mean): 83 (15 Nov 2019 05:15) (83 - 83)  RR: 18 (15 Nov 2019 12:03) (17 - 18)  SpO2: 94% (15 Nov 2019 12:03) (94% - 95%)    11-14 @ 07:01  -  11-15 @ 07:00  --------------------------------------------------------  IN: 0 mL / OUT: 450 mL / NET: -450 mL      Physical Exam:  General: WN/WD NAD  Neurology: A&Ox2, nonfocal, LOFTON x 4  Respiratory: CTA B/L  CV: irregularly irregular, S1S2  Abdominal: Soft, NT, ND +BS  Extremities: No edema, + peripheral pulses      LABS:                        11.3   10.44 )-----------( 181      ( 14 Nov 2019 08:30 )             35.7                 RADIOLOGY & ADDITIONAL TESTS:

## 2019-11-15 NOTE — DIETITIAN INITIAL EVALUATION ADULT. - PERTINENT LABORATORY DATA
Hgb 11.3 (L), Hct 35.7, Na 138 (WNL), K 4.5 (WNL), Cl 103 (WNL), CO2 30 (WNL), BUN 21 (WNL), Creat .79 (WNL), Glu 116 (H)

## 2019-11-15 NOTE — DIETITIAN INITIAL EVALUATION ADULT. - PERTINENT MEDS FT
MEDICATIONS  (STANDING):  apixaban 2.5 milliGRAM(s) Oral every 12 hours  atorvastatin 80 milliGRAM(s) Oral at bedtime  azithromycin  IVPB      azithromycin  IVPB 500 milliGRAM(s) IV Intermittent every 24 hours  cefepime  Injectable. 1000 milliGRAM(s) IV Push every 12 hours  cyanocobalamin 1000 MICROGram(s) Oral daily  escitalopram 5 milliGRAM(s) Oral <User Schedule>  fluticasone propionate 50 MICROgram(s)/spray Nasal Spray 2 Spray(s) Both Nostrils daily  levothyroxine 25 MICROGram(s) Oral daily  losartan 50 milliGRAM(s) Oral daily  metoprolol succinate ER 25 milliGRAM(s) Oral two times a day    MEDICATIONS  (PRN):

## 2019-11-15 NOTE — SWALLOW BEDSIDE ASSESSMENT ADULT - SWALLOW EVAL: CRITERIA FOR SKILLED INTERVENTION MET
DO NOT FEEL THAT ACUTE SPEECH PATHOLOGY INTERVENTION WOULD /BE OF BENEFIT. PT'S DYSARTHRIA/DYSPHAGIA/COGNITIVE DYSFUNCTION ARE CHRONIC/PRE-EXISTING FOR WHICH SHE PREVIOUSLY COMPLETED COURSES OF SPEECH PATHOLOGY INTERVENTION. HER COMMUNICATIVE/SWALLOWING INTEGRITY ARE FELT TO BE AT PRE-HOSPITALIZATION STATE/MAXIMIZED. PT IS NOT A VIABLE THERAPY CANDIDATE WHILE IN HOUSE REGARDLESS GIVEN THE SEVERITY OF HER COGNITIVE DYSFUNCTION. GIVEN THE ABOVE, WILL NOT ACTIVELY FOLLOW. RECONSULT PRN

## 2019-11-15 NOTE — PROGRESS NOTE ADULT - SUBJECTIVE AND OBJECTIVE BOX
HPI: Pt is a 89y old Female with hx of AFib, HTN, hypothyroid, anemia, recent stroke, ataxia   Came in through the ED on 11/13/2019 with an increased productive cough for 3 days increased lethargy and weakness since yesterday. Today patient was with her aid at home and started vomiting during her lunch. Patient is unable to provide any history due to confusion. patient daughter provides history for mother and states that she had a stroke about 3 months ago and since then has been getting weaker. Patient is in a chair most of the day and uses a stair lift to get up and down stairs. Daughter employees a private hire aide at time when she needs help.       11/15/2019 patient seen and examined with daughter and  at the bedside. Patient denies any pain. Pleasantly confused     PAIN: ( )Yes   (x )No  DYSPNEA: ( ) Yes  (x ) No      PAST MEDICAL & SURGICAL HISTORY:  Atrial fibrillation, chronic  Other iron deficiency anemia  Carpal tunnel syndrome, right  Hypothyroid  Hypertension  S/P cataract surgery, right  S/P hip replacement, left: 12/2009      SOCIAL HX:    Hx opiate tolerance ( )YES  (x )NO    Baseline ADLs  (Prior to Admission)  ( ) Independent   (x )Dependent    was home with a lot of assistance     FAMILY HISTORY:  unable to contribute     Review of Systems:    Unable to obtain/Limited due to: confusion     PHYSICAL EXAM:    Vital Signs Last 24 Hrs  T(C): 36.6 (15 Nov 2019 12:03), Max: 37.2 (15 Nov 2019 05:15)  T(F): 97.8 (15 Nov 2019 12:03), Max: 98.9 (15 Nov 2019 05:15)  HR: 105 (15 Nov 2019 12:03) (82 - 111)  BP: 113/63 (15 Nov 2019 12:03) (113/63 - 154/86)  BP(mean): 83 (15 Nov 2019 05:15) (83 - 83)  RR: 18 (15 Nov 2019 12:03) (17 - 18)  SpO2: 94% (15 Nov 2019 12:03) (94% - 95%)  PPSV2: 10-20 %    General: pleasant elderly women in bed, appears comfortable NAD  Mental Status: patient alert but confused   HEENT: dry oral mucosa + temporal wasting   Lungs: diminished b/l, no respiratory distress at this time   Cardiac: S1S2+   GI: non tender, non distended  : + voiding   Ext: no edema present      LABS:                        11.3   10.44 )-----------( 181      ( 14 Nov 2019 08:30 )             35.7     11-13    138  |  103  |  21  ----------------------------<  116<H>  4.5   |  30  |  0.79    Ca    8.9      13 Nov 2019 15:41    TPro  6.9  /  Alb  3.1<L>  /  TBili  2.0<H>  /  DBili  x   /  AST  14<L>  /  ALT  13  /  AlkPhos  71  11-13    PT/INR - ( 13 Nov 2019 15:41 )   PT: 15.7 sec;   INR: 1.40 ratio         PTT - ( 13 Nov 2019 15:41 )  PTT:32.6 sec  Albumin: Albumin, Serum: 3.1 g/dL (11-13 @ 15:41)      Allergies    No Known Allergies    Intolerances      MEDICATIONS  (STANDING):  apixaban 2.5 milliGRAM(s) Oral every 12 hours  atorvastatin 80 milliGRAM(s) Oral at bedtime  azithromycin  IVPB      azithromycin  IVPB 500 milliGRAM(s) IV Intermittent every 24 hours  cefepime  Injectable. 1000 milliGRAM(s) IV Push every 12 hours  cyanocobalamin 1000 MICROGram(s) Oral daily  escitalopram 5 milliGRAM(s) Oral <User Schedule>  fluticasone propionate 50 MICROgram(s)/spray Nasal Spray 2 Spray(s) Both Nostrils daily  levothyroxine 25 MICROGram(s) Oral daily  losartan 50 milliGRAM(s) Oral daily  metoprolol succinate ER 25 milliGRAM(s) Oral two times a day    MEDICATIONS  (PRN):    RADIOLOGY/ADDITIONAL STUDIES:  < from: CT Chest w/ IV Cont (11.13.19 @ 17:55) >  EXAM:  CT CHEST IC                            PROCEDURE DATE:  11/13/2019      INTERPRETATION:  CLINICAL INFORMATION: Cough jaundice, right upper   quadrant pain    COMPARISON: Noncontrast CT chest 4/12/2018    PROCEDURE:   CT of the Chest, Abdomen and Pelvis was performed with intravenous   contrast.   Intravenous contrast: 90 ml Omnipaque 350. 10 ml discarded.  Oral contrast: None.  Sagittal and coronal reformats were performed.    FINDINGS:    CHEST:     LUNGS AND LARGE AIRWAYS: Patent central airways. Patchy left lower lobe   atelectasis/infiltrate. Correlate clinically for infection..  PLEURA: Trace right small left pleural effusion.  VESSELS: Nonaneurysmal.  HEART: enlarged. No pericardial effusion.  MEDIASTINUM AND ALISON: 2 cm necrotic left hilar mass/adenopathy likely   neoplastic..  CHEST WALL AND LOWER NECK: Within normal limits.    ABDOMEN AND PELVIS:    LIVER: A 1.5 cm hypodense lesion with mild peripheral enhancement in the   medial segment left hepatic lobe and a 4.3 cmperipherally enhancing   lesion in the right hepatic lobe may represent hemangiomas and/or   metastatic deposits. Correlate with dedicated hepatic MRI..  BILE DUCTS: Normal caliber.  GALLBLADDER: Within normal limits.  SPLEEN: Within normal limits.  PANCREAS: Within normal limits.  ADRENALS: Within normal limits.  KIDNEYS/URETERS: Within normal limits.    BLADDER: Within normal limits.  REPRODUCTIVE ORGANS: Atrophic postmenopausal uterus with calcified   degenerating fibroid. A 2.3 cm left adnexal cyst. Correlate with pelvic   ultrasound    BOWEL: No bowel obstruction or inflammation. Moderate rectal fecal   impaction.. Appendix no appendicitis  PERITONEUM: No ascites.  VESSELS: Atherosclerotic nonaneurysmal.  RETROPERITONEUM/LYMPH NODES: No lymphadenopathy.    ABDOMINAL WALL: Small fat-containing umbilical hernia.  BONES: Spinal degenerative change. No acute or aggressive pathology.    IMPRESSION:     Necrotic left hilar mass/adenopathy likely neoplastic.  Patchy infiltrate/atelectasis left lower lobe. Correlate clinically for   active infection.  Trace right small left pleural effusion.  Indeterminate hypodense hepatic lesions. Metastatic disease not excluded.   Correlate with MR.  Additional findings as discussed      < end of copied text >  < from: Xray Chest 1 View AP/PA. (11.13.19 @ 17:08) >  EXAM:  XR CHEST 1 VIEW                            PROCEDURE DATE:  11/13/2019          INTERPRETATION:  Exam Date: 11/13/2019 5:08 PM    History: Cough    Technique: Single frontal portable view of the chest with comparison to    9/4/2019    Findings:    The heart is enlarged. Question left pleural effusion and/or left basilar   atelectasis, repeat PA and lateral films can be obtained as indicated.   The apices and hemidiaphragms are unremarkable. Degenerative changes of   the visualized osseous structures.        Impression:    Question left pleural effusion and/or left basilar atelectasis, repeat PA   and lateral films can be obtained as indicated    < end of copied text >

## 2019-11-15 NOTE — DIETITIAN INITIAL EVALUATION ADULT. - OTHER INFO
89 yo female with a PMHx of afib, HTN, hypothyroid, anemia, recent stroke, ataxia presents to the ED with an increased productive cough for 3 days increased lethargy and weakness since yesterday. Today patient was with her aid at home and started vomiting during her lunch. No fever or diarrhea. EMS placed O2 because patient was very lethargic. Patient is unable to provide any history due to her baseline status(like confusion). Her daughter at the bedside provided the history. As per the daughter, patient is having cough with productive sputum over 1 week. But she had no fever. She was also looking very weak for the last 1 week. She was not eating properly for the last 2-3 days. Usually she is able to eat all types of food. But today she had vomiting during lunch time and became more lethargic. At that time, ambulance was called in. Nonsmoker, no ETOH, no drugs.     Pt. is not a good historian due to altered mental status. Daughter at bedside. Daughter takes Pt's UBW is 92 lbs. no recent wt. loss. Pt. weight 2 years ago was 102 lbs. As per previous RD note Pt. Wt. was 90.3 lbs (9/16/19) Pt. appetite is fair, consuming <50% of ENN. Daughter reports no n/v/d/c/. Daughter states pt. Pervious admission pt. was receiving nectar consistency liquids. At home visiting speech therapist recommended regular consistency with thin liquids. Question accuracy of diet consistency, recommend pt. have a SLP eval. Daughter state pt. was aspirating at home on reg cons. thin liquids. Daughter states pt. likes softer vegetables (puree consistency) at home and adds gravy to meals to make it easier to swallow. Daughter states Pt. likes scrambled eggs or omelets for breakfast and tuna or egg salad for lunch. Will inform diet office of pt. preferences. Recommended Ensure Enlive BID, daughter states Pt. enjoys it at home. 89 yo female with a PMHx of afib, HTN, hypothyroid, anemia, recent stroke, ataxia presents to the ED with an increased productive cough for 3 days increased lethargy and weakness since yesterday. Today patient was with her aid at home and started vomiting during her lunch. No fever or diarrhea. EMS placed O2 because patient was very lethargic. Patient is unable to provide any history due to her baseline status(like confusion). Her daughter at the bedside provided the history. As per the daughter, patient is having cough with productive sputum over 1 week. But she had no fever. She was also looking very weak for the last 1 week. She was not eating properly for the last 2-3 days. Usually she is able to eat all types of food. But today she had vomiting during lunch time and became more lethargic. At that time, ambulance was called in. Nonsmoker, no ETOH, no drugs.     Pt. is not a good historian due to altered mental status. Daughter at bedside. Daughter takes Pt's UBW is 92 lbs. no recent wt. loss. Pt. weight 2 years ago was 102 lbs. As per previous RD note Pt. Wt. was 90.3 lbs (9/16/19) Pt. appetite is fair, consuming <50% of ENN. Daughter reports no n/v/d/c/. Daughter states pt. Pervious admission pt. was receiving nectar consistency liquids. At home visiting speech therapist recommended regular consistency with thin liquids. Question accuracy of diet consistency, recommend pt. have a SLP eval. Daughter states pt. was aspirating at home on regular cons. thin liquids.   Pt. likes softer vegetables (puree consistency) at home and adds gravy to meals to make it easier to swallow. Daughter states Pt. likes scrambled eggs or omelets for breakfast and tuna or egg salad for lunch. Will inform diet office of pt. preferences. Recommended Ensure Enlive BID, daughter agrees states Pt. drinks it at home. Pt. receive SLP eval at  on 11/16. SLP recommendation is DASH/TLC Dysphagia 2 Diet w/ nectar thick liquids 89 yo female with a PMHx of afib, HTN, hypothyroid, anemia, recent stroke, ataxia presents to the ED with an increased productive cough for 3 days increased lethargy and weakness since yesterday. Today patient was with her aid at home and started vomiting during her lunch. No fever or diarrhea. EMS placed O2 because patient was very lethargic. Patient is unable to provide any history due to her baseline status(like confusion). Her daughter at the bedside provided the history. As per the daughter, patient is having cough with productive sputum over 1 week. But she had no fever. She was also looking very weak for the last 1 week. She was not eating properly for the last 2-3 days. Usually she is able to eat all types of food. But today she had vomiting during lunch time and became more lethargic. At that time, ambulance was called in. Nonsmoker, no ETOH, no drugs.     Pt. is not a good historian due to altered mental status. Daughter at bedside. Daughter states Pt's UBW is 92 lbs. no recent wt. loss. Pt. weight 2 years ago was 102 lbs. As per previous RD note Pt. Wt. was 90.3 lbs, UBW: 99 lbs (9/16/19) Pt. appetite is fair consuming <75% of ENN. Daughter reports no n/v/d/c/. Daughter states pt. Pervious admission pt. was receiving nectar consistency liquids. At home visiting speech therapist recommended regular consistency with thin liquids. Question accuracy of diet consistency, recommend pt. have a SLP eval. Daughter states pt. was aspirating at home on regular cons. thin liquids.   Pt. likes softer vegetables (puree consistency) at home and adds gravy to meals to make it easier to swallow. Daughter states Pt. likes scrambled eggs or omelets for breakfast and tuna or egg salad for lunch. Will inform diet office of pt. preferences. Recommended Ensure Enlive BID, daughter agrees states Pt. drinks it at home. Pt. receive SLP eval at  on 11/16. SLP recommendation is DASH/TLC Dysphagia 2 Diet w/ nectar thick liquids. Elevated high direct  mg/dL and Cholesterol serum 225 mg/dL noted 89 yo female with a PMHx of afib, HTN, hypothyroid, anemia, recent stroke, ataxia presents to the ED with an increased productive cough for 3 days increased lethargy and weakness since yesterday. Today patient was with her aid at home and started vomiting during her lunch. No fever or diarrhea.  Patient is unable to provide any history due to her baseline status(like confusion). Her daughter at the bedside provided the history. As per the daughter, patient is having cough with productive sputum over 1 week. But she had no fever. She was also looking very weak for the last 1 week. She was not eating properly for the last 2-3 days. Usually she is able to eat all types of food. But today she had vomiting during lunch time and became more lethargic.     Pt. is not a good historian due to altered mental status. Daughter at bedside. Daughter states Pt's UBW is 92 lbs. no recent wt. loss. Pt. weight 2 years ago was 102 lbs. As per previous RD note Pt. Wt. was 90.3 lbs, UBW: 99 lbs (9/16/19) Pt. appetite is fair consuming <75% of ENN. Daughter reports no n/v/d/c/. Daughter states pt. Pervious admission pt. was receiving nectar consistency liquids. At home visiting speech therapist recommended regular consistency with thin liquids. Question accuracy of diet consistency, recommend pt. have a SLP eval. Daughter states pt. was aspirating at home on regular cons. thin liquids.   Pt. likes softer vegetables (puree consistency) at home and adds gravy to meals to make it easier to swallow. Daughter states Pt. likes scrambled eggs or omelets for breakfast and tuna or egg salad for lunch. Will inform diet office of pt. preferences. Recommended Ensure Enlive BID, daughter agrees states Pt. drinks it at home. Pt. receive SLP eval at  on 11/16. SLP recommendation is DASH/TLC Dysphagia 2 Diet w/ nectar thick liquids. Elevated high direct  mg/dL and Cholesterol serum 225 mg/dL noted

## 2019-11-15 NOTE — DIETITIAN INITIAL EVALUATION ADULT. - FACTORS AFF FOOD INTAKE
change in mental status/difficulty chewing/difficulty swallowing/difficulty with food procurement/preparation

## 2019-11-15 NOTE — CDI QUERY NOTE - NSCDIOTHERTXTBX_GEN_ALL_CORE_HH
Documentation on chart of HCAP PNA.    Hospitalist documented: Suspected post obstructive pneumonia in the setting of abnormal imaging findings  Lesion in liver - likely neoplastic - family wants no intervention     ID documented:  LLL pneumonia. ? aspiration. leukocytosis.    Antibiotics:  azithromycin  IVPB 500 milliGRAM(s) IV Intermittent every 24 hours  cefepime  Injectable. 1000 milliGRAM(s) IV Push every 12 hours    Please clarify the type of PNA and necessitation of the use of Cefepime intravenous.   A) Aspiration Pneumonia  B) Gram Negative Donald Pneumonia  C) Other ( Please specify)  D) Unknown

## 2019-11-15 NOTE — SWALLOW BEDSIDE ASSESSMENT ADULT - SWALLOW EVAL: RECOMMENDED FEEDING/EATING TECHNIQUES
maintain upright posture during/after eating for 30 mins/check mouth frequently for oral residue/pocketing/crush medication (when feasible)/small sips/bites

## 2019-11-15 NOTE — GOALS OF CARE CONVERSATION - ADVANCED CARE PLANNING - CONVERSATION/DISCUSSION
Diagnosis/Prognosis/MOLST Discussed/Treatment Options
MOLST Discussed
Prognosis/Treatment Options/Diagnosis/MOLST Discussed

## 2019-11-15 NOTE — GOALS OF CARE CONVERSATION - ADVANCED CARE PLANNING - PARTICIPANTS
Staff.../Family.../Pt. present but confused cannot participate, meeting held in the room as pt. gets agitated when her daughter leaves

## 2019-11-15 NOTE — SWALLOW BEDSIDE ASSESSMENT ADULT - SLP GENERAL OBSERVATIONS
On encounter, a loss of bulk was noted in pt's strap muscle regions. A mild head tremor was apparent. Mild facial asymmetry was noted. The pt is alert and interactive but distractible, anxious, impulsive and very [place]. She was able to verbalize during communicative probes. At these times, her speech output was mildly slurred c/w Dysarthria and she had difficulty modulating her vocal intensity due to hearing loss so she spoke at an excessively loud volume. Her underlying utterances were linguistically intact but reflective of variable baseline short term memory/cognitive deficits. Effects of prior right Parietal CVA/Dementia are felt to be contributory. Communicative integrity is at pre-hospitalization state/maximized.

## 2019-11-15 NOTE — SWALLOW BEDSIDE ASSESSMENT ADULT - SWALLOW EVAL: CURRENT DIET
Diet suggestion by speech pathology is pending a swallowing consult. Diet suggestion by speech pathology is pending a swallowing consult at this time.

## 2019-11-15 NOTE — DIETITIAN INITIAL EVALUATION ADULT. - ETIOLOGY
inadequate protein/energy intake >1 month, wt. loss of >5% in 1 month inadequate PO intake 2/2 AMS, swallowing difficulties

## 2019-11-15 NOTE — GOALS OF CARE CONVERSATION - ADVANCED CARE PLANNING - PHONE #
home - [phone]  cell - [phone]
home - [phone]  cell - [phone]
home - [phone]  cell - [phone]

## 2019-11-15 NOTE — CDI QUERY NOTE - NSCDIOTHERTXTBX2_GEN_ALL_CORE_FT
Documentation on chart by ED provider: on 100% O2- saturation dropped to 88% when pt was on room air. Pt replaced with 5 liters nasal canula and non-rebreather.  · RESPIRATORY: +poor effort and rhonchi bilaterally    Pt. admitted with HCAP Pneumonia.    RR: 18 (15 Nov 2019 08:23) (16 - 19)    SpO2: 94% (15 Nov 2019 08:23) (91% - 99%)    Please consider PF ratio    Please clarify a diagnosis based on the above clinical indicators:  A) Acute Hypoxic Respiratory Failure  B) Acute Respiratory Distress Syndrome  C) Other ( Please specify)  D) Unable to determine

## 2019-11-15 NOTE — DIETITIAN INITIAL EVALUATION ADULT. - SIGNS/SYMPTOMS
decreased intake >5 day, consuming >75 % of meals >1 month, severe muscle wasting and fat loss consuming <75 % of meals >1 month, severe muscle wasting and fat loss, wt loss

## 2019-11-15 NOTE — GOALS OF CARE CONVERSATION - ADVANCED CARE PLANNING - CONVERSATION DETAILS
Team met with patients family to discuss goals of care, assist with planning and provide supportive counseling. Pt. was living at home with  her daughter prior to admission. Pt. had a private hire aide during the day for pt. and she was with pt. at night. Patient daughter discussed how patient confusing has gotten worse recently.     Patients current medical condition and goals of care was discussed. We discussed how patient has possible Metastatic Cancer. patients daughter is aware of this and has decided to not purse a biopsy or interventions as she does not feel pt. would want to be aggressive. patients daughter would like patient to return home and focus on her comfort and quality of life. Patients     Patients daughter has had multiple losses including her  and her father therefore, the conversation and decision making was emotional for her however, she was able to make decision based on what she felt her mother would want.     Team discussed the option of patient  returning home with home hospice. Hospice services and philosophy explained in detail. We explained difference between hospice and palliative care. Team recommended home hospice services as patient daughter wishes to focus on patient comfort. We reviewed in detail different scenarios that could happen at home and what hospice would do with IV fluids or antibiotics, which we explained is a case by case basis and hospice would make that decision. Patients daughter expressed understanding of this. Patients daughter also is aware that she may need to hire additional help and reports they have the financial means to do so.    Patient daughter is in agreement with plan for home hospice and would like a referral send to [facility] for home hospice services. Wendy MARINA made aware and will send referral.    MOLST was reviewed and completed. MOLST states DNR/DNI, Comfort Measures, Do not send to hospital, No feeding Tube, Trial of IV fluids, Use Antibiotics.     Plan at this time is for home hospice [facility]. Emotional support provided. Our team will continue to follow.

## 2019-11-15 NOTE — PROGRESS NOTE ADULT - ASSESSMENT
89 yo female with a PMHx of afib, HTN, hypothyroid, anemia, recent stroke, ataxia presents to the ED with an increased productive cough for 3 days increased lethargy and weakness since 11/12. patient was with her aid at home and started vomiting during her lunch. No fever or diarrhea. EMS placed O2 because patient was very lethargic. Patient is unable to provide any history due to her baseline status(like confusion). Her daughter at the bedside provided the history. As per the daughter, patient is having cough with productive sputum over 1 week. But she had no fever. She was also looking very weak for the last 1 week. She was not eating properly for the last 2-3 days. Usually she is able to eat all types of food. But today she had vomiting during lunch time and became more lethargic. At that time, ambulance was called in. Here wbc ct 13, CT chest with LLL PNA, L hilar mass? suspected malignancy ? liver lesion was given IV vanco/cefepime/azithro.    1. LLL pneumonia-aspiration. UTI. resolved leukocytosis. hx CVA  - wbc ct normalized, improving  - urine cx growing >100,000 GNRs f/u final cx, blood cx no growth  - aspiration precautions  - on IV cefepime [id number] #2  - on azithromycin 500 mg daily #3/3  - tolerating abx well so far; no side effects noted  - reason for abx use and side effects reviewed with patient  - supportive care  - fu cbc    2. other issues - care per medicine

## 2019-11-15 NOTE — PROGRESS NOTE ADULT - ASSESSMENT
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 Patchy infiltrate/atelectasis left lower lobe. Correlate clinically for   active infection. Trace right small left pleural effusion. Indeterminate hypodense hepatic lesions. Metastatic disease not excluded.   Correlate with MR. Additional findings as discussed.   - continue IV cefepime and Zithromax  - follow ID consult   - family aware of CT findings -  no aggressive interventions  - poor long term prognosis  - care discussed with Dr. Magana  -F/U urine cx for sensitivity    # Chronic A fib  - HR controlled  - continue metoprolol and Eliquis    # HTN  -stable  -continue current medications    # Hypothyroidism  -continue Levothyroxine    # Lesion in Liver  -?hemangioma vs malignancy     # Code status: DNR     - palliative care evaluation for GOC discussion- patient likely to go home on home hospice

## 2019-11-15 NOTE — GOALS OF CARE CONVERSATION - ADVANCED CARE PLANNING - CONVERSATION DETAILS
Team met with pts family to discuss goals of care, assist with planning and provide supportive counseling. Pt. was living at home with  her daughter prior to admission. Pt. had a private hire aide during the day for pt. and she was with pt. at night. Pts daughter discussed how pts confusing has gotten worse recently.     Pts current medical condition and goals of acre discussed. We discussed how pt. has possible Metastatic Cancer. Pts daughter is aware of this and has decided to not purse a biopsy or interventions as she does not feel pt. would want to be aggressive. Pts daughter would like pt. to return home and focus on her comfort and quality of life.     Pts daughter has had multiple losses including her  and her father therefore, the conversation and decision making was emotional for her however, she was able to make decision based on what she felt her mother would want.     Team discussed the option of pt. returning home with home hospice. Hospice services and philosophy explained in detail. We explained difference between hospice and palliative care. Team recommended home hospice services as pts daughter wishes to focus on pts comfort. We reviewed in detail different scenarios that could happen at home and what hospice would do with IV fluids or antibiotics, which we explained is a case by case basis and hospice would make that decision. Pts daughter expressed understanding of this. Pts daughter also is aware that she may need to hire additional help and reports they have the financial means to do so.    Pts daughter is in agreement with plan for home hospice and would like a referral send to [facility] for home hospice services. Wendy MARINA made aware and will send referral.    MOLST was reviewed and completed. MOLST states DNR/DNI, Comfort Measures, Do not send to hospital, Team met with pts family to discuss goals of care, assist with planning and provide supportive counseling. Pt. was living at home with  her daughter prior to admission. Pt. had a private hire aide during the day for pt. and she was with pt. at night. Pts daughter discussed how pts confusing has gotten worse recently.     Pts current medical condition and goals of acre discussed. We discussed how pt. has possible Metastatic Cancer. Pts daughter is aware of this and has decided to not purse a biopsy or interventions as she does not feel pt. would want to be aggressive. Pts daughter would like pt. to return home and focus on her comfort and quality of life.     Pts daughter has had multiple losses including her  and her father therefore, the conversation and decision making was emotional for her however, she was able to make decision based on what she felt her mother would want.     Team discussed the option of pt. returning home with home hospice. Hospice services and philosophy explained in detail. We explained difference between hospice and palliative care. Team recommended home hospice services as pts daughter wishes to focus on pts comfort. We reviewed in detail different scenarios that could happen at home and what hospice would do with IV fluids or antibiotics, which we explained is a case by case basis and hospice would make that decision. Pts daughter expressed understanding of this. Pts daughter also is aware that she may need to hire additional help and reports they have the financial means to do so.    Pts daughter is in agreement with plan for home hospice and would like a referral send to [facility] for home hospice services. Wendy MARINA made aware and will send referral.    MOLST was reviewed and completed. MOLST states DNR/DNI, Comfort Measures, Do not send to hospital, No feeding Tube, Trial of IV fluids, Use Antibiotics.     Plan at this time is for home hospice [facility]. Emotional support provided. Our team will continue to follow.

## 2019-11-15 NOTE — SWALLOW BEDSIDE ASSESSMENT ADULT - DIET PRIOR TO ADMI
The patient was on a Dysphagia 2 Diet with Nectar Thick Liquids when seen by this service during a prior hospitalization at this facility in 9/19. Pt's daughter stated that pt has been drinking liquids without thickener at times which results in coughing.

## 2019-11-16 ENCOUNTER — TRANSCRIPTION ENCOUNTER (OUTPATIENT)
Age: 84
End: 2019-11-16

## 2019-11-16 LAB
HCT VFR BLD CALC: 35.4 % — SIGNIFICANT CHANGE UP (ref 34.5–45)
HGB BLD-MCNC: 11.2 G/DL — LOW (ref 11.5–15.5)
MCHC RBC-ENTMCNC: 28.9 PG — SIGNIFICANT CHANGE UP (ref 27–34)
MCHC RBC-ENTMCNC: 31.6 GM/DL — LOW (ref 32–36)
MCV RBC AUTO: 91.2 FL — SIGNIFICANT CHANGE UP (ref 80–100)
PLATELET # BLD AUTO: 174 K/UL — SIGNIFICANT CHANGE UP (ref 150–400)
RBC # BLD: 3.88 M/UL — SIGNIFICANT CHANGE UP (ref 3.8–5.2)
RBC # FLD: 15 % — HIGH (ref 10.3–14.5)
WBC # BLD: 7.76 K/UL — SIGNIFICANT CHANGE UP (ref 3.8–10.5)
WBC # FLD AUTO: 7.76 K/UL — SIGNIFICANT CHANGE UP (ref 3.8–10.5)

## 2019-11-16 RX ORDER — ACETAMINOPHEN 500 MG
650 TABLET ORAL ONCE
Refills: 0 | Status: COMPLETED | OUTPATIENT
Start: 2019-11-16 | End: 2019-11-16

## 2019-11-16 RX ORDER — CEFUROXIME AXETIL 250 MG
1 TABLET ORAL
Qty: 12 | Refills: 0
Start: 2019-11-16 | End: 2019-11-21

## 2019-11-16 RX ADMIN — Medication 650 MILLIGRAM(S): at 23:55

## 2019-11-16 RX ADMIN — ESCITALOPRAM OXALATE 5 MILLIGRAM(S): 10 TABLET, FILM COATED ORAL at 08:18

## 2019-11-16 RX ADMIN — CEFEPIME 1000 MILLIGRAM(S): 1 INJECTION, POWDER, FOR SOLUTION INTRAMUSCULAR; INTRAVENOUS at 06:06

## 2019-11-16 RX ADMIN — APIXABAN 2.5 MILLIGRAM(S): 2.5 TABLET, FILM COATED ORAL at 21:55

## 2019-11-16 RX ADMIN — Medication 2 SPRAY(S): at 10:06

## 2019-11-16 RX ADMIN — PREGABALIN 1000 MICROGRAM(S): 225 CAPSULE ORAL at 10:06

## 2019-11-16 RX ADMIN — Medication 25 MILLIGRAM(S): at 06:07

## 2019-11-16 RX ADMIN — APIXABAN 2.5 MILLIGRAM(S): 2.5 TABLET, FILM COATED ORAL at 10:06

## 2019-11-16 RX ADMIN — Medication 25 MICROGRAM(S): at 06:07

## 2019-11-16 RX ADMIN — Medication 25 MILLIGRAM(S): at 17:36

## 2019-11-16 RX ADMIN — LOSARTAN POTASSIUM 50 MILLIGRAM(S): 100 TABLET, FILM COATED ORAL at 06:07

## 2019-11-16 RX ADMIN — ESCITALOPRAM OXALATE 5 MILLIGRAM(S): 10 TABLET, FILM COATED ORAL at 21:55

## 2019-11-16 RX ADMIN — Medication 650 MILLIGRAM(S): at 22:24

## 2019-11-16 RX ADMIN — ATORVASTATIN CALCIUM 80 MILLIGRAM(S): 80 TABLET, FILM COATED ORAL at 21:55

## 2019-11-16 RX ADMIN — CEFEPIME 1000 MILLIGRAM(S): 1 INJECTION, POWDER, FOR SOLUTION INTRAMUSCULAR; INTRAVENOUS at 17:37

## 2019-11-16 NOTE — DISCHARGE NOTE NURSING/CASE MANAGEMENT/SOCIAL WORK - NSDCPEELIQUIS_GEN_ALL_CORE
Apixaban/Eliquis - Potential for adverse drug reactions and interactions/Apixaban/Eliquis - Follow up monitoring/Apixaban/Eliquis - Compliance/Apixaban/Eliquis - Dietary Advice

## 2019-11-16 NOTE — DISCHARGE NOTE NURSING/CASE MANAGEMENT/SOCIAL WORK - PATIENT PORTAL LINK FT
You can access the FollowMyHealth Patient Portal offered by [facility] by registering at the following website: [email or web address]. By joining [employer]’s FollowMyHealth portal, you will also be able to view your health information using other applications (apps) compatible with our system.

## 2019-11-16 NOTE — PROGRESS NOTE ADULT - SUBJECTIVE AND OBJECTIVE BOX
INTERVAL HPI/OVERNIGHT EVENTS:  89 yo female with a PMHx of afib, HTN, hypothyroid, anemia, recent stroke, ataxia presents to the ED with an increased productive cough for 3 days increased lethargy and weakness since yesterday. Today patient was with her aid at home and started vomiting during her lunch. No fever or diarrhea. EMS placed O2 because patient was very lethargic. Patient is unable to provide any history due to her baseline status(like confusion). Her daughter at the bedside provided the history. As per the daughter, patient is having cough with productive sputum over 1 week. But she had no fever. She was also looking very weak for the last 1 week. She was not eating properly for the last 2-3 days. Usually she is able to eat all types of food. But today she had vomiting during lunch time and became more lethargic. At that time, ambulance was called in. Nonsmoker, no ETOH, no drugs.     11/14: Seen and eval. Hemodynamically stable. Denies any HA, CP, SOB. Patient on an imaging has high suspicion for malignancy. Daughter understands -  no aggressive interventions. Palliative care eval. Family does not wants patient know about her CT findings besides pneumonia.   11/15/19- Patient seen and examined at bedside. States she feels well, denies any CP, SOB. Daughter updated, answered all questions. Spoke with Palliative, patient likely to go home on home hospice.  11/16/19- Patient seen and examined at bedside. Confused, states she wants to go home, denies any CP, SOB, abd pain.    MEDICATIONS  (STANDING):  apixaban 2.5 milliGRAM(s) Oral every 12 hours  atorvastatin 80 milliGRAM(s) Oral at bedtime  cefepime  Injectable. 1000 milliGRAM(s) IV Push every 12 hours  cyanocobalamin 1000 MICROGram(s) Oral daily  escitalopram 5 milliGRAM(s) Oral <User Schedule>  fluticasone propionate 50 MICROgram(s)/spray Nasal Spray 2 Spray(s) Both Nostrils daily  levothyroxine 25 MICROGram(s) Oral daily  losartan 50 milliGRAM(s) Oral daily  metoprolol succinate ER 25 milliGRAM(s) Oral two times a day    MEDICATIONS  (PRN):      Allergies    No Known Allergies    Intolerances      Limited ROS 2/2 confusion, but denies CP, SOB, abd pain.    Vital Signs Last 24 Hrs  T(C): 36.8 (16 Nov 2019 15:29), Max: 36.9 (16 Nov 2019 05:52)  T(F): 98.2 (16 Nov 2019 15:29), Max: 98.5 (16 Nov 2019 05:52)  HR: 94 (16 Nov 2019 15:29) (82 - 95)  BP: 117/66 (16 Nov 2019 15:29) (109/63 - 149/56)  BP(mean): --  RR: 17 (16 Nov 2019 15:29) (17 - 18)  SpO2: 98% (16 Nov 2019 15:29) (94% - 98%)      Physical Exam:  General: WN/WD NAD  Neurology: A&Ox2, nonfocal, LOFTON x 4  Respiratory: CTA B/L  CV: irregularly irregular, S1S2  Abdominal: Soft, NT, ND +BS  Extremities: No edema, + peripheral pulses      LABS:                        11.2   7.76  )-----------( 174      ( 16 Nov 2019 08:14 )             35.4                 RADIOLOGY & ADDITIONAL TESTS:

## 2019-11-16 NOTE — PROGRESS NOTE ADULT - ASSESSMENT
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 Patchy infiltrate/atelectasis left lower lobe. Correlate clinically for   active infection. Trace right small left pleural effusion. Indeterminate hypodense hepatic lesions. Metastatic disease not excluded.   Correlate with MR. Additional findings as discussed.   - continue IV cefepime and Zithromax  - follow ID consult   - family aware of CT findings -  no aggressive interventions  - poor long term prognosis  - care discussed with Dr. Magana  - F/U urine cx for sensitivity- kleb oxytoca- can switch to PO ceftin upon d/c    # Chronic A fib  - HR controlled  - continue metoprolol and Eliquis    # HTN  -stable  -continue current medications    # Hypothyroidism  -continue Levothyroxine    # Lesion in Liver  -?hemangioma vs malignancy     # Code status: DNR     - palliative care evaluation for GOC discussion- patient likely to go home on home hospice in AM

## 2019-11-17 RX ORDER — ACETAMINOPHEN 500 MG
650 TABLET ORAL EVERY 6 HOURS
Refills: 0 | Status: DISCONTINUED | OUTPATIENT
Start: 2019-11-17 | End: 2019-11-18

## 2019-11-17 RX ADMIN — Medication 650 MILLIGRAM(S): at 23:45

## 2019-11-17 RX ADMIN — CEFEPIME 1000 MILLIGRAM(S): 1 INJECTION, POWDER, FOR SOLUTION INTRAMUSCULAR; INTRAVENOUS at 17:13

## 2019-11-17 RX ADMIN — ATORVASTATIN CALCIUM 80 MILLIGRAM(S): 80 TABLET, FILM COATED ORAL at 20:49

## 2019-11-17 RX ADMIN — ESCITALOPRAM OXALATE 5 MILLIGRAM(S): 10 TABLET, FILM COATED ORAL at 08:16

## 2019-11-17 RX ADMIN — Medication 25 MILLIGRAM(S): at 06:34

## 2019-11-17 RX ADMIN — CEFEPIME 1000 MILLIGRAM(S): 1 INJECTION, POWDER, FOR SOLUTION INTRAMUSCULAR; INTRAVENOUS at 06:34

## 2019-11-17 RX ADMIN — Medication 25 MICROGRAM(S): at 06:34

## 2019-11-17 RX ADMIN — LOSARTAN POTASSIUM 50 MILLIGRAM(S): 100 TABLET, FILM COATED ORAL at 06:34

## 2019-11-17 RX ADMIN — PREGABALIN 1000 MICROGRAM(S): 225 CAPSULE ORAL at 08:16

## 2019-11-17 RX ADMIN — APIXABAN 2.5 MILLIGRAM(S): 2.5 TABLET, FILM COATED ORAL at 20:50

## 2019-11-17 RX ADMIN — Medication 25 MILLIGRAM(S): at 17:13

## 2019-11-17 RX ADMIN — APIXABAN 2.5 MILLIGRAM(S): 2.5 TABLET, FILM COATED ORAL at 08:16

## 2019-11-17 RX ADMIN — Medication 650 MILLIGRAM(S): at 22:06

## 2019-11-17 RX ADMIN — ESCITALOPRAM OXALATE 5 MILLIGRAM(S): 10 TABLET, FILM COATED ORAL at 20:49

## 2019-11-17 RX ADMIN — Medication 2 SPRAY(S): at 08:17

## 2019-11-17 NOTE — PROGRESS NOTE ADULT - ASSESSMENT
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 Patchy infiltrate/atelectasis left lower lobe. Correlate clinically for   active infection. Trace right small left pleural effusion. Indeterminate hypodense hepatic lesions. Metastatic disease not excluded.   Correlate with MR. Additional findings as discussed.   - continue IV cefepime and Zithromax  - follow ID consult   - family aware of CT findings -  no aggressive interventions  - poor long term prognosis  - care discussed with Dr. Magana  - F/U urine cx for sensitivity- kleb oxytoca- can switch to PO ceftin upon d/c    # Chronic A fib  - HR controlled  - continue metoprolol and Eliquis    # HTN  -stable  -continue current medications    # Hypothyroidism  -continue Levothyroxine    # Lesion in Liver  -?hemangioma vs malignancy     # Code status: DNR     - palliative care evaluation for GOC discussion- daughter does not want to pursue hospice care at this time, SW to follow up.

## 2019-11-17 NOTE — PROGRESS NOTE ADULT - SUBJECTIVE AND OBJECTIVE BOX
INTERVAL HPI/OVERNIGHT EVENTS:  89 yo female with a PMHx of afib, HTN, hypothyroid, anemia, recent stroke, ataxia presents to the ED with an increased productive cough for 3 days increased lethargy and weakness since yesterday. Today patient was with her aid at home and started vomiting during her lunch. No fever or diarrhea. EMS placed O2 because patient was very lethargic. Patient is unable to provide any history due to her baseline status(like confusion). Her daughter at the bedside provided the history. As per the daughter, patient is having cough with productive sputum over 1 week. But she had no fever. She was also looking very weak for the last 1 week. She was not eating properly for the last 2-3 days. Usually she is able to eat all types of food. But today she had vomiting during lunch time and became more lethargic. At that time, ambulance was called in. Nonsmoker, no ETOH, no drugs.     11/14: Seen and eval. Hemodynamically stable. Denies any HA, CP, SOB. Patient on an imaging has high suspicion for malignancy. Daughter understands -  no aggressive interventions. Palliative care eval. Family does not wants patient know about her CT findings besides pneumonia.   11/15/19- Patient seen and examined at bedside. States she feels well, denies any CP, SOB. Daughter updated, answered all questions. Spoke with Palliative, patient likely to go home on home hospice.  11/16/19- Patient seen and examined at bedside. Confused, states she wants to go home, denies any CP, SOB, abd pain.  11/17/19- Patient seen and examined at bedside. States she feels well, denies any CP, SOB, abd pain. Spoke with daughter at bedside, states she does not feel comfortable taking patient home on hospice, wants to speak with her outpatient PCP regarding CT findings.    MEDICATIONS  (STANDING):  apixaban 2.5 milliGRAM(s) Oral every 12 hours  atorvastatin 80 milliGRAM(s) Oral at bedtime  cefepime  Injectable. 1000 milliGRAM(s) IV Push every 12 hours  cyanocobalamin 1000 MICROGram(s) Oral daily  escitalopram 5 milliGRAM(s) Oral <User Schedule>  fluticasone propionate 50 MICROgram(s)/spray Nasal Spray 2 Spray(s) Both Nostrils daily  levothyroxine 25 MICROGram(s) Oral daily  losartan 50 milliGRAM(s) Oral daily  metoprolol succinate ER 25 milliGRAM(s) Oral two times a day    MEDICATIONS  (PRN):      Allergies    No Known Allergies    Intolerances      Limited ROS 2/2 confusion, but denies CP, SOB, abd pain.    Vital Signs Last 24 Hrs  T(C): 36.6 (17 Nov 2019 11:32), Max: 36.8 (16 Nov 2019 15:29)  T(F): 97.8 (17 Nov 2019 11:32), Max: 98.2 (16 Nov 2019 15:29)  HR: 86 (17 Nov 2019 11:32) (86 - 97)  BP: 124/60 (17 Nov 2019 11:32) (113/69 - 137/79)  BP(mean): --  RR: 18 (17 Nov 2019 11:32) (17 - 18)  SpO2: 95% (17 Nov 2019 11:32) (95% - 98%)      Physical Exam:  General: WN/WD NAD  Neurology: A&Ox2, nonfocal, LOFTON x 4  Respiratory: CTA B/L  CV: irregularly irregular, S1S2  Abdominal: Soft, NT, ND +BS  Extremities: No edema, + peripheral pulses      LABS:                        11.2   7.76  )-----------( 174      ( 16 Nov 2019 08:14 )             35.4                 RADIOLOGY & ADDITIONAL TESTS:

## 2019-11-18 ENCOUNTER — TRANSCRIPTION ENCOUNTER (OUTPATIENT)
Age: 84
End: 2019-11-18

## 2019-11-18 VITALS
HEART RATE: 94 BPM | DIASTOLIC BLOOD PRESSURE: 71 MMHG | OXYGEN SATURATION: 98 % | SYSTOLIC BLOOD PRESSURE: 126 MMHG | TEMPERATURE: 98 F

## 2019-11-18 RX ORDER — ACETAMINOPHEN 500 MG
2 TABLET ORAL
Qty: 0 | Refills: 0 | DISCHARGE
Start: 2019-11-18

## 2019-11-18 RX ADMIN — Medication 2 SPRAY(S): at 12:55

## 2019-11-18 RX ADMIN — PREGABALIN 1000 MICROGRAM(S): 225 CAPSULE ORAL at 12:55

## 2019-11-18 RX ADMIN — Medication 25 MICROGRAM(S): at 05:48

## 2019-11-18 RX ADMIN — ESCITALOPRAM OXALATE 5 MILLIGRAM(S): 10 TABLET, FILM COATED ORAL at 10:10

## 2019-11-18 RX ADMIN — Medication 25 MILLIGRAM(S): at 05:48

## 2019-11-18 RX ADMIN — APIXABAN 2.5 MILLIGRAM(S): 2.5 TABLET, FILM COATED ORAL at 10:10

## 2019-11-18 RX ADMIN — LOSARTAN POTASSIUM 50 MILLIGRAM(S): 100 TABLET, FILM COATED ORAL at 05:48

## 2019-11-18 RX ADMIN — CEFEPIME 1000 MILLIGRAM(S): 1 INJECTION, POWDER, FOR SOLUTION INTRAMUSCULAR; INTRAVENOUS at 05:48

## 2019-11-18 NOTE — DISCHARGE NOTE PROVIDER - NSDCCPCAREPLAN_GEN_ALL_CORE_FT
PRINCIPAL DISCHARGE DIAGNOSIS  Diagnosis: UTI (urinary tract infection)  Assessment and Plan of Treatment: -Complete course of antibiotics- last day 11/22/19  -Follow up with PCP within 2 weeks of discharge      SECONDARY DISCHARGE DIAGNOSES  Diagnosis: Lung mass  Assessment and Plan of Treatment: -Necrotic lung mass seen on CT scan  -Patient and family aware, will follow up with PCP as outpatient    Diagnosis: Liver lesion  Assessment and Plan of Treatment: -Noted on CT scan, daughter aware. Follow up with PCP as outpatient

## 2019-11-18 NOTE — DISCHARGE NOTE PROVIDER - HOSPITAL COURSE
89 yo female with a PMHx of afib, HTN, hypothyroid, anemia, recent stroke, ataxia presents to the ED with an increased productive cough for 3 days increased lethargy and weakness since yesterday. Today patient was with her aid at home and started vomiting during her lunch. No fever or diarrhea. EMS placed O2 because patient was very lethargic. Patient is unable to provide any history due to her baseline status(like confusion). Her daughter at the bedside provided the history. As per the daughter, patient is having cough with productive sputum over 1 week. But she had no fever. She was also looking very weak for the last 1 week. She was not eating properly for the last 2-3 days. Usually she is able to eat all types of food. But today she had vomiting during lunch time and became more lethargic. At that time, ambulance was called in. Nonsmoker, no ETOH, no drugs.         11/14: Seen and eval. Hemodynamically stable. Denies any HA, CP, SOB. Patient on an imaging has high suspicion for malignancy. Daughter understands -  no aggressive interventions. Palliative care eval. Family does not wants patient know about her CT findings besides pneumonia.     11/15/19- Patient seen and examined at bedside. States she feels well, denies any CP, SOB. Daughter updated, answered all questions. Spoke with Palliative, patient likely to go home on home hospice.    11/16/19- Patient seen and examined at bedside. Confused, states she wants to go home, denies any CP, SOB, abd pain.    11/17/19- Patient seen and examined at bedside. States she feels well, denies any CP, SOB, abd pain. Spoke with daughter at bedside, states she does not feel comfortable taking patient home on hospice, wants to speak with her outpatient PCP regarding CT findings.    11/18/19- Patient seen and examined at bedside. States she feels well, denies any CP, SOB, abd pain. Spoke with daughter at bedside, she will like to take patient home.        Physical Exam:    General: WN/WD NAD    Neurology: A&Ox2, nonfocal, LOFTON x 4    Respiratory: CTA B/L    CV: irregularly irregular, S1S2    Abdominal: Soft, NT, ND +BS    Extremities: No edema, + peripheral pulses        # Suspected post obstructive pneumonia in the setting of abnormal imaging findgins    - CT scan: Necrotic left hilar mass/adenopathy likely neoplastic. Patchy infiltrate/atelectasis left lower lobe. Correlate clinically for     active infection. Trace right small left pleural effusion. Indeterminate hypodense hepatic lesions. Metastatic disease not excluded.     Correlate with MR. Additional findings as discussed.     - continue IV cefepime- switch to PO abx on discharge    - follow ID consult     - family aware of CT findings -  no aggressive interventions    - poor long term prognosis    - care discussed with Dr. Magana    - F/U urine cx for sensitivity- kleb oxytoca- can switch to PO ceftin upon d/c        # Chronic A fib    - HR controlled    - continue metoprolol and Eliquis        # HTN    -stable    -continue current medications        # Hypothyroidism    -continue Levothyroxine        # Lesion in Liver    -?hemangioma vs malignancy    -daughter aware of findings, wants to follow up with her PCP as outpatient        # Code status: DNR       - palliative care evaluation for GOC discussion- daughter does not want to pursue hospice care at this time        Total time spent on discharge including coordination of care: 45 minutes

## 2019-11-18 NOTE — PROGRESS NOTE ADULT - SUBJECTIVE AND OBJECTIVE BOX
HPI: Pt is a 89y old Female with hx of AFib, HTN, hypothyroid, anemia, recent stroke, ataxia   Came in through the ED on 11/13/2019 with an increased productive cough for 3 days increased lethargy and weakness since yesterday. Today patient was with her aid at home and started vomiting during her lunch. Patient is unable to provide any history due to confusion. patient daughter provides history for mother and states that she had a stroke about 3 months ago and since then has been getting weaker. Patient is in a chair most of the day and uses a stair lift to get up and down stairs. Daughter employees a private hire aide at time when she needs help.       11/18/2019 patient seen and examined with daughter at bedside, patient more alert this AM eating breakfast. Denies any pain or SOB. no new complaints at this time      PAIN: ( )Yes   (x )No  DYSPNEA: ( ) Yes  (x ) No    Review of Systems:    Unable to obtain/Limited due to: denies     PHYSICAL EXAM:    Vital Signs Last 24 Hrs  T(C): 36.7 (18 Nov 2019 05:41), Max: 36.8 (17 Nov 2019 16:44)  T(F): 98.1 (18 Nov 2019 05:41), Max: 98.3 (17 Nov 2019 16:44)  HR: 94 (18 Nov 2019 05:41) (86 - 115)  BP: 131/89 (18 Nov 2019 05:41) (124/60 - 147/91)  RR: 18 (18 Nov 2019 05:41) (16 - 20)  SpO2: 98% (18 Nov 2019 05:41) (95% - 98%)    PPSV2: 20-30  %  FAST:     General: pleasant elderly women in bed, appears comfortable NAD  Mental Status: patient alert but confused   HEENT: dry oral mucosa + temporal wasting   Lungs: diminished b/l, no respiratory distress at this time   Cardiac: S1S2+   GI: non tender, non distended  : + voiding   Ext: no edema present       LABS:  Albumin: Albumin, Serum: 3.1 g/dL (11-13 @ 15:41)      Allergies    No Known Allergies    Intolerances      MEDICATIONS  (STANDING):  apixaban 2.5 milliGRAM(s) Oral every 12 hours  atorvastatin 80 milliGRAM(s) Oral at bedtime  cefepime  Injectable. 1000 milliGRAM(s) IV Push every 12 hours  cyanocobalamin 1000 MICROGram(s) Oral daily  escitalopram 5 milliGRAM(s) Oral <User Schedule>  fluticasone propionate 50 MICROgram(s)/spray Nasal Spray 2 Spray(s) Both Nostrils daily  levothyroxine 25 MICROGram(s) Oral daily  losartan 50 milliGRAM(s) Oral daily  metoprolol succinate ER 25 milliGRAM(s) Oral two times a day    MEDICATIONS  (PRN):  acetaminophen   Tablet .. 650 milliGRAM(s) Oral every 6 hours PRN Temp greater or equal to 38C (100.4F), Mild Pain (1 - 3)

## 2019-11-18 NOTE — PROGRESS NOTE ADULT - SUBJECTIVE AND OBJECTIVE BOX
Date of service: 11-18-19 @ 11:50    pt seen and examined  sitting up in bed  feeling much better  MS improved    ROS: no fever or chills; denies dizziness, no HA, no abdominal pain, no diarrhea or constipation; no dysuria, no urinary frequency, no legs pain, no rashes    MEDICATIONS  (STANDING):  apixaban 2.5 milliGRAM(s) Oral every 12 hours  atorvastatin 80 milliGRAM(s) Oral at bedtime  cefepime  Injectable. 1000 milliGRAM(s) IV Push every 12 hours  cyanocobalamin 1000 MICROGram(s) Oral daily  escitalopram 5 milliGRAM(s) Oral <User Schedule>  fluticasone propionate 50 MICROgram(s)/spray Nasal Spray 2 Spray(s) Both Nostrils daily  levothyroxine 25 MICROGram(s) Oral daily  losartan 50 milliGRAM(s) Oral daily  metoprolol succinate ER 25 milliGRAM(s) Oral two times a day    Vital Signs Last 24 Hrs  T(C): 36.7 (18 Nov 2019 05:41), Max: 36.8 (17 Nov 2019 16:44)  T(F): 98.1 (18 Nov 2019 05:41), Max: 98.3 (17 Nov 2019 16:44)  HR: 94 (18 Nov 2019 05:41) (88 - 115)  BP: 131/89 (18 Nov 2019 05:41) (131/89 - 147/91)  BP(mean): --  RR: 18 (18 Nov 2019 05:41) (16 - 20)  SpO2: 98% (18 Nov 2019 05:41) (96% - 98%)    PE:  Constitutional: frail looking  HEENT: NC/AT, EOMI, PERRLA, conjunctivae clear; ears and nose atraumatic; pharynx benign  Neck: supple; thyroid not palpable  Back: no tenderness  Respiratory: decreased breath sounds   Cardiovascular: S1S2 regular, no murmurs  Abdomen: soft, not tender, not distended, positive BS; liver and spleen WNL  Genitourinary: no suprapubic tenderness  Lymphatic: no LN palpable  Musculoskeletal: no muscle tenderness, no joint swelling or tenderness  Extremities: no pedal edema  Neurological/ Psychiatric:  moving all extremities  Skin: no rashes; no palpable lesions    Labs: all available labs reviewed                               Cultures:   Culture - Blood (11.13.19 @ 17:24)    Specimen Source: .Blood None    Culture Results:   No growth to date.    Culture - Urine (11.13.19 @ 16:52)    -  Amikacin: S <=16    -  Ampicillin: R >16 These ampicillin results predict results for amoxicillin    -  Ampicillin/Sulbactam: S <=8/4 Enterobacter, Citrobacter, and Serratia may develop resistance during prolonged therapy (3-4 days)    -  Aztreonam: S <=4    -  Cefazolin: S <=8    -  Cefepime: S <=4    -  Cefoxitin: S <=8    -  Ceftriaxone: S <=1 Enterobacter, Citrobacter, and Serratia may develop resistance during prolonged therapy    -  Ciprofloxacin: S <=1    -  Gentamicin: S <=4    -  Imipenem: S <=1    -  Levofloxacin: S <=2    -  Meropenem: S <=1    -  Nitrofurantoin: S <=32 Should not be used to treat pyelonephritis    -  Piperacillin/Tazobactam: S <=16    -  Tigecycline: S <=2    -  Tobramycin: S <=4    -  Trimethoprim/Sulfamethoxazole: S <=2/38    Specimen Source: .Urine None    Culture Results:   >100,000 CFU/ml Klebsiella oxytoca    Organism Identification: Klebsiella oxytoca    Organism: Klebsiella oxytoca    Method Type: VIRGIE          Radiology: all available radiological tests reviewed    Advanced directives addressed: DNR/DNI

## 2019-11-18 NOTE — DISCHARGE NOTE PROVIDER - NSDCHHNEEDSERVICE_GEN_ALL_CORE
Rehabilitation services/Medication teaching and assessment/Observation and assessment/Teaching and training

## 2019-11-18 NOTE — DISCHARGE NOTE PROVIDER - NSDCMRMEDTOKEN_GEN_ALL_CORE_FT
acetaminophen 325 mg oral tablet: 2 tab(s) orally every 6 hours, As needed, Temp greater or equal to 38C (100.4F), Mild Pain (1 - 3)  apixaban 2.5 mg oral tablet: 1 tab(s) orally every 12 hours  atorvastatin 80 mg oral tablet: 1 tab(s) orally once a day (at bedtime)  cefuroxime 500 mg oral tablet: 1 tab(s) orally every 12 hours   escitalopram 10 mg oral tablet: 0.5 tab(s) orally 2 times a day  losartan 50 mg oral tablet: 1 tab(s) orally 2 times a day  Metoprolol Succinate ER 25 mg oral tablet, extended release: 1 tab(s) orally 2 times a day  Nasacort Allergy 24HR 55 mcg/inh nasal spray: 2 spray(s) nasal once a day  Synthroid 25 mcg (0.025 mg) oral tablet: 1 tab(s) orally once a day  Vitamin B-12 1000 mcg sublingual tablet: 1 tab(s) sublingual once a day

## 2019-11-18 NOTE — PROGRESS NOTE ADULT - ASSESSMENT
89 yo female with a PMHx of afib, HTN, hypothyroid, anemia, recent stroke, ataxia presents to the ED with an increased productive cough for 3 days increased lethargy and weakness since 11/12. patient was with her aid at home and started vomiting during her lunch. No fever or diarrhea. EMS placed O2 because patient was very lethargic. Patient is unable to provide any history due to her baseline status(like confusion). Her daughter at the bedside provided the history. As per the daughter, patient is having cough with productive sputum over 1 week. But she had no fever. She was also looking very weak for the last 1 week. She was not eating properly for the last 2-3 days. Usually she is able to eat all types of food. But today she had vomiting during lunch time and became more lethargic. At that time, ambulance was called in. Here wbc ct 13, CT chest with LLL PNA, L hilar mass? suspected malignancy ? liver lesion was given IV vanco/cefepime/azithro.    1. LLL pneumonia-aspiration. UTI. resolved leukocytosis. hx CVA  - wbc ct normalized, improving  - urine cx growing >100,000 Kleb oxytoca sensitivities reviewed  - blood cx no growth  - aspiration precautions  - on IV cefepime [id number] #5, switch to po ceftin 2 more days for 7 day course  - s/p azithromycin 500 mg daily #3/3  - tolerating abx well so far; no side effects noted  - reason for abx use and side effects reviewed with patient  - supportive care  - fu cbc    2. other issues - care per medicine

## 2019-11-18 NOTE — DISCHARGE NOTE PROVIDER - CARE PROVIDER_API CALL
eLvi Magana)  Gastroenterology; Internal Medicine  [address] [place] [address], [address]  [place], [place] [id number]  Phone: [phone]  Fax: [phone]  Follow Up Time: 1 week

## 2019-11-18 NOTE — PROGRESS NOTE ADULT - REASON FOR ADMISSION
complain of cough and lethargy

## 2019-11-20 RX ORDER — ATORVASTATIN CALCIUM 80 MG/1
80 TABLET, FILM COATED ORAL
Qty: 90 | Refills: 1 | Status: ACTIVE | COMMUNITY
Start: 2019-09-10

## 2019-11-26 NOTE — CHART NOTE - NSCHARTNOTEFT_GEN_A_CORE
Clarification of diagnosis:  Patient had acute hypoxemic respiratory failure- POA  Patient had likely aspiration PNA

## 2019-11-27 DIAGNOSIS — R27.0 ATAXIA, UNSPECIFIED: ICD-10-CM

## 2019-11-27 DIAGNOSIS — R17 UNSPECIFIED JAUNDICE: ICD-10-CM

## 2019-11-27 DIAGNOSIS — Z66 DO NOT RESUSCITATE: ICD-10-CM

## 2019-11-27 DIAGNOSIS — B96.1 KLEBSIELLA PNEUMONIAE [K. PNEUMONIAE] AS THE CAUSE OF DISEASES CLASSIFIED ELSEWHERE: ICD-10-CM

## 2019-11-27 DIAGNOSIS — K76.9 LIVER DISEASE, UNSPECIFIED: ICD-10-CM

## 2019-11-27 DIAGNOSIS — J69.0 PNEUMONITIS DUE TO INHALATION OF FOOD AND VOMIT: ICD-10-CM

## 2019-11-27 DIAGNOSIS — N39.0 URINARY TRACT INFECTION, SITE NOT SPECIFIED: ICD-10-CM

## 2019-11-27 DIAGNOSIS — I10 ESSENTIAL (PRIMARY) HYPERTENSION: ICD-10-CM

## 2019-11-27 DIAGNOSIS — C34.02 MALIGNANT NEOPLASM OF LEFT MAIN BRONCHUS: ICD-10-CM

## 2019-11-27 DIAGNOSIS — E03.9 HYPOTHYROIDISM, UNSPECIFIED: ICD-10-CM

## 2019-11-27 DIAGNOSIS — Z86.73 PERSONAL HISTORY OF TRANSIENT ISCHEMIC ATTACK (TIA), AND CEREBRAL INFARCTION WITHOUT RESIDUAL DEFICITS: ICD-10-CM

## 2019-11-27 DIAGNOSIS — Z96.642 PRESENCE OF LEFT ARTIFICIAL HIP JOINT: ICD-10-CM

## 2019-11-27 DIAGNOSIS — I48.20 CHRONIC ATRIAL FIBRILLATION, UNSPECIFIED: ICD-10-CM

## 2019-11-27 DIAGNOSIS — E43 UNSPECIFIED SEVERE PROTEIN-CALORIE MALNUTRITION: ICD-10-CM

## 2019-11-27 DIAGNOSIS — J96.01 ACUTE RESPIRATORY FAILURE WITH HYPOXIA: ICD-10-CM

## 2019-11-29 ENCOUNTER — APPOINTMENT (OUTPATIENT)
Dept: INTERNAL MEDICINE | Facility: CLINIC | Age: 84
End: 2019-11-29
Payer: MEDICARE

## 2019-11-29 VITALS — BODY MASS INDEX: 15.17 KG/M2 | WEIGHT: 94 LBS

## 2019-11-29 VITALS
SYSTOLIC BLOOD PRESSURE: 102 MMHG | HEART RATE: 72 BPM | TEMPERATURE: 97.7 F | HEIGHT: 66 IN | OXYGEN SATURATION: 75 % | RESPIRATION RATE: 15 BRPM | DIASTOLIC BLOOD PRESSURE: 52 MMHG

## 2019-11-29 DIAGNOSIS — J18.9 PNEUMONIA, UNSPECIFIED ORGANISM: ICD-10-CM

## 2019-11-29 DIAGNOSIS — E78.5 HYPERLIPIDEMIA, UNSPECIFIED: ICD-10-CM

## 2019-11-29 DIAGNOSIS — I48.91 UNSPECIFIED ATRIAL FIBRILLATION: ICD-10-CM

## 2019-11-29 DIAGNOSIS — I63.9 CEREBRAL INFARCTION, UNSPECIFIED: ICD-10-CM

## 2019-11-29 DIAGNOSIS — R39.198 OTHER DIFFICULTIES WITH MICTURITION: ICD-10-CM

## 2019-11-29 DIAGNOSIS — I10 ESSENTIAL (PRIMARY) HYPERTENSION: ICD-10-CM

## 2019-11-29 DIAGNOSIS — E03.9 HYPOTHYROIDISM, UNSPECIFIED: ICD-10-CM

## 2019-11-29 PROCEDURE — 99495 TRANSJ CARE MGMT MOD F2F 14D: CPT

## 2019-11-29 RX ORDER — ESCITALOPRAM OXALATE 10 MG/1
10 TABLET ORAL DAILY
Qty: 90 | Refills: 1 | Status: ACTIVE | COMMUNITY
Start: 2019-11-18

## 2019-11-29 RX ORDER — CEFUROXIME AXETIL 500 MG/1
500 TABLET ORAL
Qty: 4 | Refills: 0 | Status: DISCONTINUED | COMMUNITY
Start: 2019-11-20 | End: 2019-11-29

## 2019-11-29 RX ORDER — ESCITALOPRAM OXALATE 5 MG/1
5 TABLET ORAL TWICE DAILY
Refills: 0 | Status: DISCONTINUED | COMMUNITY
Start: 2019-09-17 | End: 2019-11-29

## 2019-11-30 PROBLEM — I63.9 CVA (CEREBRAL VASCULAR ACCIDENT): Status: ACTIVE | Noted: 2019-09-17

## 2019-11-30 PROBLEM — R39.198 URINARY DYSFUNCTION: Status: ACTIVE | Noted: 2019-03-20

## 2019-11-30 NOTE — HISTORY OF PRESENT ILLNESS
[Post-hospitalization from ___ Hospital] : Post-hospitalization from [unfilled] Hospital [Discharged on ___] : discharged on [unfilled] [FreeTextEntry2] : 89-year-old woman recently discharged from [facility] after an admission for pneumonia he was discharged on November 18, 2019 during that hospitalization a chest CAT scan was performed which showed a large lung mass patient's past history is also significant for atrial fibrillation chronic anticoagulation previous CVA hyperlipidemia hypertension hypothyroidism and urinary dysfunction she is just completing a course of antibiotics she denies temperature chills sweats or myalgias she's had no headache sinus congestion sore throat cough wheezing pleurisy chest pain shortness of breath exertional dyspnea lightheadedness palpitations dizziness vertigo or syncope she denies abdominal pain nausea vomiting diarrhea constipation or signs of GI bleeding she has had frequent urination chronically but denies dysuria or gross hematuria she has chronic mild leg edema and denies any recent skin rashes she is without apparent musculoskeletal complaints

## 2019-11-30 NOTE — ASSESSMENT
[FreeTextEntry1] : Physical exam reveals a cheerful elderly woman in no acute distress blood pressure 102/52 height 5 feet 6 inches weight 94 pounds heart rate of 70-80 irregularly irregular respiratory rate of 15 HEENT was unremarkable chest showed decreased breath sounds with no rhonchi rales or dullness to percussion cardiovascular exam was irregularly irregular abdomen was soft and nontender extremities show trace bilateral edema neurologic exam was nonfocal patient with remarkable good spirits smiling and engaged with the conversation long discussion with daughter about the lung mass who agrees to see an oncologist for the consideration of palliative radiation she is in agreement that no aggressive biopsying and chemotherapy would probably be indicated with her at her age and with her doing so well at present we'll obtain old records including blood tests from [facility] she is up-to-date with her neurologist and defers on mammograms or colorectal cancer screening she is active and walks during the day and has a good appetite

## 2019-11-30 NOTE — HEALTH RISK ASSESSMENT
[0] : 1) Little interest or pleasure doing things: Not at all (0) [No] : No [No falls in past year] : Patient reported no falls in the past year [] : No [[id number]] : 0

## 2019-11-30 NOTE — REVIEW OF SYSTEMS
[Fatigue] : fatigue [Hearing Loss] : hearing loss [Nasal Discharge] : nasal discharge [Postnasal Drip] : postnasal drip [Constipation] : constipation [Poor Libido] : poor libido [Nocturia] : nocturia [Joint Stiffness] : joint stiffness [Muscle Weakness] : muscle weakness [Back Pain] : back pain [Insomnia] : insomnia [Depression] : depression [Anxiety] : anxiety [Negative] : Neurological [Hot Flashes] : no hot flashes [Night Sweats] : no night sweats [Chills] : no chills [Fever] : no fever [Pain] : no pain [Recent Change In Weight] : ~T no recent weight change [Discharge] : no discharge [Vision Problems] : no vision problems [Redness] : no redness [Dryness] : no dryness  [Hoarseness] : no hoarseness [Itching] : no itching [Earache] : no earache [Palpitations] : no palpitations [Sore Throat] : no sore throat [Chest Pain] : no chest pain [Orthopnea] : no orthopnea [Lower Ext Edema] : no lower extremity edema [Leg Claudication] : no leg claudication [Wheezing] : no wheezing [Cough] : no cough [Shortness Of Breath] : no shortness of breath [Dyspnea on Exertion] : no dyspnea on exertion [Abdominal Pain] : no abdominal pain [Diarrhea] : diarrhea [Nausea] : no nausea [Vomiting] : no vomiting [Dysuria] : no dysuria [Melena] : no melena [Heartburn] : no heartburn [Frequency] : no frequency [Incontinence] : no incontinence [Hematuria] : no hematuria [Joint Pain] : no joint pain [Vaginal Discharge] : no vaginal discharge [Dysmenorrhea] : no dysmenorrhea [Muscle Pain] : no muscle pain [Joint Swelling] : no joint swelling [Mole Changes] : no mole changes [Nail Changes] : no nail changes [Hair Changes] : no hair changes [Skin Rash] : no skin rash [Headache] : no headache [Dizziness] : no dizziness [Fainting] : no fainting [Memory Loss] : no memory loss [Confusion] : no confusion [Unsteady Walking] : no ataxia [Suicidal] : not suicidal [Easy Bleeding] : no easy bleeding [Easy Bruising] : no easy bruising [Swollen Glands] : no swollen glands [FreeTextEntry9] : Left-sided weakness of the arm and leg

## 2019-11-30 NOTE — PHYSICAL EXAM
[No Acute Distress] : no acute distress [Well-Appearing] : well-appearing [Well Developed] : well developed [Well Nourished] : well nourished [PERRL] : pupils equal round and reactive to light [Normal Voice/Communication] : normal voice/communication [Normal Sclera/Conjunctiva] : normal sclera/conjunctiva [EOMI] : extraocular movements intact [Normal Outer Ear/Nose] : the outer ears and nose were normal in appearance [Normal Oropharynx] : the oropharynx was normal [Normal Nasal Mucosa] : the nasal mucosa was normal [Normal TMs] : both tympanic membranes were normal [No Lymphadenopathy] : no lymphadenopathy [No JVD] : no jugular venous distention [Supple] : supple [No Accessory Muscle Use] : no accessory muscle use [Thyroid Normal, No Nodules] : the thyroid was normal and there were no nodules present [No Respiratory Distress] : no respiratory distress  [Normal Rate] : normal rate  [Clear to Auscultation] : lungs were clear to auscultation bilaterally [Normal Percussion] : the chest was normal to percussion [Normal S1, S2] : normal S1 and S2 [Regular Rhythm] : with a regular rhythm [No Carotid Bruits] : no carotid bruits [No Murmur] : no murmur heard [No Edema] : there was no peripheral edema [No Abdominal Bruit] : a ~M bruit was not heard ~T in the abdomen [No Varicosities] : no varicosities [Pedal Pulses Present] : the pedal pulses are present [No Palpable Aorta] : no palpable aorta [No Extremity Clubbing/Cyanosis] : no extremity clubbing/cyanosis [Declined Breast Exam] : declined breast exam  [Soft] : abdomen soft [Non-distended] : non-distended [Non Tender] : non-tender [No Masses] : no abdominal mass palpated [No HSM] : no HSM [Normal Bowel Sounds] : normal bowel sounds [No Hernias] : no hernias [Normal Supraclavicular Nodes] : no supraclavicular lymphadenopathy [Declined Rectal Exam] : declined rectal exam [Normal Axillary Nodes] : no axillary lymphadenopathy [Normal Inguinal Nodes] : no inguinal lymphadenopathy [Normal Anterior Cervical Nodes] : no anterior cervical lymphadenopathy [Normal Posterior Cervical Nodes] : no posterior cervical lymphadenopathy [Normal Femoral Nodes] : no femoral lymphadenopathy [No CVA Tenderness] : no CVA  tenderness [No Spinal Tenderness] : no spinal tenderness [Grossly Normal Strength/Tone] : grossly normal strength/tone [No Rash] : no rash [No Joint Swelling] : no joint swelling [Coordination Grossly Intact] : coordination grossly intact [No Skin Lesions] : no skin lesions [Deep Tendon Reflexes (DTR)] : deep tendon reflexes were 2+ and symmetric [No Focal Deficits] : no focal deficits [Normal Gait] : normal gait [Normal Affect] : the affect was normal [Speech Grossly Normal] : speech grossly normal [Memory Grossly Normal] : memory grossly normal [Alert and Oriented x3] : oriented to person, place, and time [Normal Insight/Judgement] : insight and judgment were intact [Normal Mood] : the mood was normal [Scoliosis] : no scoliosis [Kyphosis] : no kyphosis [Acne] : no acne

## 2019-12-04 ENCOUNTER — OUTPATIENT (OUTPATIENT)
Dept: OUTPATIENT SERVICES | Facility: HOSPITAL | Age: 84
LOS: 1 days | Discharge: HOME | End: 2019-12-04

## 2019-12-04 DIAGNOSIS — C34.90 MALIGNANT NEOPLASM OF UNSPECIFIED PART OF UNSPECIFIED BRONCHUS OR LUNG: ICD-10-CM

## 2019-12-04 DIAGNOSIS — Z96.642 PRESENCE OF LEFT ARTIFICIAL HIP JOINT: Chronic | ICD-10-CM

## 2019-12-04 DIAGNOSIS — Z98.41 CATARACT EXTRACTION STATUS, RIGHT EYE: Chronic | ICD-10-CM

## 2019-12-13 ENCOUNTER — APPOINTMENT (OUTPATIENT)
Dept: HEMATOLOGY ONCOLOGY | Facility: CLINIC | Age: 84
End: 2019-12-13
Payer: MEDICARE

## 2019-12-13 VITALS
OXYGEN SATURATION: 93 % | HEART RATE: 62 BPM | DIASTOLIC BLOOD PRESSURE: 69 MMHG | RESPIRATION RATE: 14 BRPM | TEMPERATURE: 98 F | SYSTOLIC BLOOD PRESSURE: 120 MMHG

## 2019-12-13 DIAGNOSIS — R91.8 OTHER NONSPECIFIC ABNORMAL FINDING OF LUNG FIELD: ICD-10-CM

## 2019-12-13 PROCEDURE — 99205 OFFICE O/P NEW HI 60 MIN: CPT

## 2019-12-13 NOTE — REASON FOR VISIT
[Initial Consultation] : an initial consultation [Formal Caregiver] : formal caregiver [Family Member] : family member [Other: _____] : [unfilled] [FreeTextEntry2] : lung neoplasm

## 2019-12-13 NOTE — HISTORY OF PRESENT ILLNESS
[de-identified] : 12/2019-Patient status post hospitalization at [facility] for cough and increased weakness over the prior week. + Episode of vomiting the day of admission. Evaluation included a CAT scan of the chest/abdomen/pelvis which revealed necrotic left hilar mass/adenopathy likely neoplastic. Patchy infiltrate/atelectasis left lower lobe. Trace right, small left pleural effusion. Indeterminate hypodense hepatic lesions. Metastatic disease not excluded. She was treated with antibiotics for aspiration pneumonia.\par Patient denies any pain. No hemoptysis. No complaints of shortness of breath at rest. 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

## 2019-12-13 NOTE — REVIEW OF SYSTEMS
[Patient Intake Form Reviewed] : Patient intake form was reviewed [Negative] : Heme/Lymph [Fever] : no fever [Chest Pain] : no chest pain [Shortness Of Breath] : no shortness of breath [Dysuria] : no dysuria [Muscle Pain] : no muscle pain [Difficulty Walking] : difficulty walking [Proptosis] : no proptosis

## 2019-12-13 NOTE — CONSULT LETTER
[Dear  ___] : Dear  [unfilled], [Please see my note below.] : Please see my note below. [Consult Letter:] : I had the pleasure of evaluating your patient, [unfilled]. [Consult Closing:] : Thank you very much for allowing me to participate in the care of this patient.  If you have any questions, please do not hesitate to contact me. [Sincerely,] : Sincerely, [FreeTextEntry3] : Kandace Sullivan M.D.

## 2019-12-13 NOTE — PHYSICAL EXAM
[Completely disabled. Cannot carry on any self care. Totally confined to bed or chair] : Status 4- Completely disabled. Cannot carry on any self care. Totally confined to bed or chair [Thin] : thin [Normal] : normal appearance, no rash, nodules, vesicles, ulcers, erythema [de-identified] : decreased BS bases [de-identified] : no calf tenderness [de-identified] : no dominant mass [de-identified] : NT [de-identified] : +tremors

## 2019-12-13 NOTE — RESULTS/DATA
[FreeTextEntry1] : 11/16/19-hemoglobin 11.2, hematocrit 35.4, WBC 7.6, platelet count 174,000, PT 15.7,/INR 1.40, PTT 32.6, calcium 8.9, total bilirubin 2.0, alkaline phosphatase 71, AST 14, ALT 13.\par 9/2019-CT scan of the head-chronic microvascular ischemic changes with a right frontal encephalomalacia noted 5/2018. Suspected evolving acute right parietal infarct (daughter states patient unable to have MRI secondary to her tremors).

## 2019-12-13 NOTE — ASSESSMENT
[FreeTextEntry1] : 89-year-old female (nonsmoker) with left lung lesion on imaging suspicious for neoplasm, and liver lesions, possibly metastatic versus? hemangiomas. Discussed clinical suspicion for lung neoplasm with patient's daughter (and  at her request)-discussed further evaluation which would be done if wish to pursue further staging/tissue diagnosis. Discussed potential treatment options if confirmed malignancy. In light of patient's advanced age and comorbidities, daughter wishes for conservative/supportive oncologic care at this time (though declines hospice). She wishes for possible short interval repeat imaging which will help her make further palliative care decisions for her mother.\par Patient/daughter were given the opportunity to ask questions. Their questions have been answered to their apparent satisfaction at this time.

## 2020-01-14 ENCOUNTER — RX RENEWAL (OUTPATIENT)
Age: 85
End: 2020-01-14

## 2020-02-04 ENCOUNTER — FORM ENCOUNTER (OUTPATIENT)
Age: 85
End: 2020-02-04

## 2020-02-05 ENCOUNTER — OUTPATIENT (OUTPATIENT)
Dept: OUTPATIENT SERVICES | Facility: HOSPITAL | Age: 85
LOS: 1 days | End: 2020-02-05
Payer: MEDICARE

## 2020-02-05 ENCOUNTER — APPOINTMENT (OUTPATIENT)
Dept: CT IMAGING | Facility: CLINIC | Age: 85
End: 2020-02-05
Payer: MEDICARE

## 2020-02-05 DIAGNOSIS — Z96.642 PRESENCE OF LEFT ARTIFICIAL HIP JOINT: Chronic | ICD-10-CM

## 2020-02-05 DIAGNOSIS — R91.8 OTHER NONSPECIFIC ABNORMAL FINDING OF LUNG FIELD: ICD-10-CM

## 2020-02-05 DIAGNOSIS — Z98.41 CATARACT EXTRACTION STATUS, RIGHT EYE: Chronic | ICD-10-CM

## 2020-02-05 PROCEDURE — 71260 CT THORAX DX C+: CPT

## 2020-02-05 PROCEDURE — 74177 CT ABD & PELVIS W/CONTRAST: CPT

## 2020-02-05 PROCEDURE — 71260 CT THORAX DX C+: CPT | Mod: 26

## 2020-02-05 PROCEDURE — 74177 CT ABD & PELVIS W/CONTRAST: CPT | Mod: 26

## 2020-02-05 PROCEDURE — 82565 ASSAY OF CREATININE: CPT

## 2020-03-16 ENCOUNTER — RX RENEWAL (OUTPATIENT)
Age: 85
End: 2020-03-16

## 2020-03-16 RX ORDER — APIXABAN 2.5 MG/1
2.5 TABLET, FILM COATED ORAL
Qty: 180 | Refills: 1 | Status: ACTIVE | COMMUNITY
Start: 2020-03-16 | End: 1900-01-01

## 2020-03-31 RX ORDER — CEFUROXIME AXETIL 500 MG/1
500 TABLET ORAL
Qty: 14 | Refills: 0 | Status: ACTIVE | COMMUNITY
Start: 2020-03-31 | End: 1900-01-01

## 2020-04-08 DIAGNOSIS — E86.0 DEHYDRATION: ICD-10-CM

## 2020-04-08 RX ORDER — DEXTROSE AND SODIUM CHLORIDE 5; 450 G/100ML; MG/100ML
5-0.45 INJECTION, SOLUTION INTRAVENOUS
Qty: 1000 | Refills: 0 | Status: ACTIVE | COMMUNITY
Start: 2020-04-08 | End: 1900-01-01

## 2020-04-17 RX ORDER — DEXTROSE AND SODIUM CHLORIDE 5; 450 G/100ML; MG/100ML
5-0.45 INJECTION, SOLUTION INTRAVENOUS
Qty: 720 | Refills: 3 | Status: ACTIVE | COMMUNITY
Start: 2020-04-17 | End: 1900-01-01

## 2020-05-29 NOTE — ED ADULT TRIAGE NOTE - HEIGHT IN CM
From: Linda Silverio  To: Lauren Fraga NP  Sent: 5/29/2020 1:44 PM CDT  Subject: Non-Urgent Medical Question    Good afternoon, quick question am I due for a pap at my CPE on Monday?    Thank you,  Linda   
165.1

## 2020-11-30 NOTE — ED PROVIDER NOTE - CPE EDP SKIN NORM
Dad would like to follow up on Jessica's infected toenail, and would like to know if a referral to podiatry would be necessary.    normal...

## 2020-12-21 PROBLEM — Z87.440 HISTORY OF URINARY TRACT INFECTION: Status: RESOLVED | Noted: 2019-03-14 | Resolved: 2020-12-21

## 2020-12-25 NOTE — ED ADULT NURSE NOTE - NS_ED_NURSE_TEACHING_TOPIC_ED_A_ED
Pharmacy Documentation     Medication: Remdesivir     Date: 12/25/20  Physician: [address] consulted to initiate remdesivir. Patient does not currently meet [facility] Use to initiate remdesivir based on no positive COVID-19 test result. Pharmacy will sign off. Please re-consult if the clinical condition changes and patient meets criteria for initiation based on [facility] Rem[facility]ivir Criteria for Use. Neurovascular

## 2021-07-16 NOTE — ED PROVIDER NOTE - CARDIOVASCULAR NEGATIVE STATEMENT, MLM
Bed: A03-03  Expected date:   Expected time:   Means of arrival:   Comments:  Isiah Moyer RN  07/16/21 6690 no chest pain and no edema.

## 2022-07-11 NOTE — PROGRESS NOTE ADULT - ASSESSMENT
2022    From the office of:   Franko Dutton CNP   [facility]  [address]  [place] [address]  Phone: [phone]  Fax: [phone]    In regards to Angie Serna, :  1942      Post Acute Skilled Nursing Home Subsequent Visit Note     Date of Service: 2022  Location seen at: [facility]  Subacute / Skilled Need: Rehabilitation    PCP: Dean Hager MD   Patient Care Team:  Dean Hager MD as PCP - General (Internal Medicine - Clinical Cardiac Electrophysiology)  Raúl Little MD as Post Acute Facility Provider: Physician (Internal Medicine)  Franko Dutton CNP as Post Acute Facility Provider: APC (Nurse Practitioner - Family)  Violet Zimmerman LCSW as Advanced Care at Home: SW ( - Clinical)  MACIEJ Cary as Advanced Care at Home: Clinician (Nurse Practitioner - Family)  Seen by Franko Dutton CNP today    Angie Serna is a 80 year old female presenting to Post Acute intermediate for: rehab.   History of Present Illness: Patient presented to hospital w/ declining health, recent 60lbs weight loss, poor appetite, SOB and recurrent falls at home. She was treated for KRAIG and hypotension. Hospital course complicated by Extensive DVT to bilateral lower extremeties, per hospital records may have PE as well but not CTA done 2/2 contrast shortage and kidney function. Patient has IVC filter in place. Palliative care eval was placed. Pt was stabilized and sent to SNF for rehab    Patient seen today for fu  CHF, edema, BLE DVT - Pt is reported to have an increase in BLE edema yesterday.  increased torsemide to 40mg po daily. Chart review notes to have a 3lbs weight gain since admission. Pt does sit in chair w/ legs elevated for long periods of time. Chart notes that patient has only received norco x2 since admission but is on scheduled tylenol q8hrs. Scheduled tylenol does not appear to be effective. Pt unsure of when  she received norco since admission and unable to identify if effective. Endorsed to nursing to give norco now. Will re-evaluate pain mgmt effectiveness. She is down 1lb since increase In torsemide  C/o headache - per hospital record pt c/o of recurrent headache or known migraine. Pt denies hx of migraine. Reports has recurrent headaches since injury 5 years ago. She reports occasional lightheaded which is not usual for her. Described as aching and annoying.  C/o irritation to buttocks from MASD. She is having soft stools and increase in urination.   Otherwise, pt reports feels \"ok\". She verbalized feeling frustrated w/ her debility and is overwhelmed.    7/11 Pt seen today for f/u  BLE edema, DVT, CHF, BLE pain - pain improved w/ scheduled norco. On lasix 40mg daily, no ACE wraps in place on assessment however reports has been on all weekend. She is on 1200ml fluid restriction andt tolerating well. There is some mild improvement to BLE but remains +1/+2. Endorsed to nursing to apply ace wraps. Labs pending  Headache - chronic in nature, improved w/ scheduled norco.  Debility - making slow gains, tolerating well.     HISTORY  Past Medical, Social, Surgical, and Family History was reviewed with the patient and was updated, as needed.    PROBLEM LIST:  Patient Active Problem List   Diagnosis   • Atrial fibrillation (CMS/[facility])   • Unspecified atrial fibrillation (CMS/HCC)   • Atrial fibrillation, unspecified type (CMS/HCC)   • Long term (current) use of anticoagulants   • Debility   • ACP (advance care planning)   • Deep vein thrombosis (DVT) of both lower extremities (CMS/[facility])   • KRAIG (acute kidney injury) (CMS/[facility])   • Type 2 diabetes mellitus with kidney complication, without long-term current use of insulin (CMS/HCC)   • Chronic diastolic congestive heart failure (CMS/HCC)   • Protein-calorie malnutrition, moderate (CMS/HCC)   • History of automatic internal cardiac defibrillator (AICD)   • History of ischemic  Pt is a 89y old Female with hx of AFib, HTN, hypothyroid, anemia, recent stroke, ataxia     Came in through the ED on 11/13/2019 with an increased productive cough for 3 days increased lethargy and weakness since yesterday. Today patient was with her aid at home and started vomiting during her lunch. Patient is unable to provide any history due to confusion. patient daughter provides history for mother and states that she had a stroke about 3 months ago and since then has been getting weaker. Patient is in a chair most of the day and uses a stair lift to get up and down stairs. Daughter employees a private hire aide at time when she needs help.       1) Debility   - Progressively getting worse as per Daughter   - PPSV2: 20 %    2) possible Metastatic Cancer     - family aware of results and would not like any aggressive interventions   - CT scan: Necrotic left hilar mass/adenopathy likely neoplastic. Indeterminate hypodense hepatic lesions. Metastatic disease not excluded.   - Daughter would like to speak with Doctor over at [facility] in radiation to review the results     3) Suspected post obstructive pneumonia   - CT scan: Patchy infiltrate/atelectasis left lower lobe, Trace right small left pleural effusion.   - continue IV cefepime and Zithromax  - Antibiotics as Per ID   - poor long term prognosis    4) Chronic A fib  - HR controlled  - continue metoprolol and Eliquis    5) Advance directives   - patient does not have capacity to make medical decisions   - Daughter surrogate at bedside C conversation on 11/14/2019 and 11/15/2019 - please refer to note   - Patient daughter Liz has decided that at this time she would like to take her mom home and follow up with PCP outpatient and then will make decisions about what the next step is.  She has a private hire aide that she will continue to use. All questions answered and emotional support provided to daughter and patient. Will be signing off at this time.     Reviewed with Dr. Motley and is aware to reconsult if any new issues arise.     spoke with Nathaly from HCP to d/c to referral to hospice at this time. cardiomyopathy   • History of ventricular tachycardia   • Major depressive disorder, recurrent episode, in full remission (CMS/HCC)   • Chronic nonintractable headache   • Pain in both lower extremities   • Diaper dermatitis       ALLERGIES:  Allergies as of 07/11/2022 - Reviewed 07/08/2022   Allergen Reaction Noted   • Mexiletine Other (See Comments) and RASH 09/22/2016   • Lisinopril Cough 07/15/2016       CURRENT MEDICATIONS:     Medications reviewed / reconciled: Yes    BASELINE FUNCTIONAL STATUS:  Walker    CURRENT FUNCTIONAL STATUS:  BED MOB T/Fs GAIT DIST/AD STAIRS UB DRESS LB DRESS TOILET BATHING     7/11  MIN MIN MIN 20 RW  CGA MOD MIN MIN       CURRENT MEDICATIONS  Medications reviewed in SNF facility EMR    DIET:  Consistency: General   Type: DM diet, cardiac diet  Appetite: Poor    REVIEW OF SYSTEMS:  Review of Systems   Constitutional: Positive for activity change and appetite change.   Cardiovascular: Positive for leg swelling (BLE dvt).   Musculoskeletal: Positive for gait problem.        BLE pain   Neurological: Positive for headaches.   All other systems reviewed and are negative.      VITALS:  Visit Vitals  /71   Pulse 71   Temp 96.1 °F (35.6 °C)   Resp 18   SpO2 97%       PAIN SCORE:  Vitals:    07/11/22 1023   PainSc: 4    PainLoc: Leg  Comment: ble       PHYSICAL ASSESSMENT:  Physical Exam  Vitals and nursing note reviewed.   Constitutional:       Appearance: She is well-developed. She is obese.   HENT:      Head: Normocephalic and atraumatic.      Nose: Nose normal.      Neck: Normal range of motion and neck supple.   Eyes:      Extraocular Movements: Extraocular movements intact.      Conjunctiva/sclera: Conjunctivae normal.      Pupils: Pupils are equal, round, and reactive to light.   Cardiovascular:      Rate and Rhythm: Normal rate. Rhythm irregular.      Heart sounds: Normal heart sounds.   Pulmonary:      Effort: Pulmonary effort is normal.      Breath sounds: Normal breath sounds.    Chest:       Abdominal:      General: Bowel sounds are normal.      Palpations: Abdomen is soft.   Musculoskeletal:         General: Normal range of motion.      Right lower leg: Edema (+1/+2) present.      Left lower leg: Edema (+1/+2) present.   Skin:     General: Skin is warm and dry.   Neurological:      General: No focal deficit present.      Mental Status: She is alert and oriented to person, place, and time. Mental status is at baseline.      Motor: No weakness.   Psychiatric:         Behavior: Behavior normal.         Thought Content: Thought content normal.         Judgment: Judgment normal.         LABS:  CBC:   WBC (K/mcL)   Date Value   07/05/2022 6.3     RBC (mil/mcL)   Date Value   07/05/2022 2.47 (L)     HGB (g/dL)   Date Value   07/05/2022 8.5 (L)     PLT (K/mcL)   Date Value   07/05/2022 280   , BMP:   Sodium (mmol/L)   Date Value   07/05/2022 133 (L)     Potassium (mmol/L)   Date Value   07/05/2022 4.8     Chloride (mmol/L)   Date Value   07/05/2022 100     Glucose (mg/dL)   Date Value   07/05/2022 86     Calcium (mg/dL)   Date Value   07/05/2022 8.4     Carbon Dioxide (mmol/L)   Date Value   07/05/2022 28     BUN (mg/dL)   Date Value   07/05/2022 63 (H)     Creatinine (mg/dL)   Date Value   07/05/2022 1.69 (H)   , LIPID Panel:   Cholesterol, Total (mg/dL)   Date Value   04/22/2017 235     HDL Cholesterol (mg/dL)   Date Value   04/22/2017 99     Triglycerides (mg/dL)   Date Value   04/22/2017 80     LDL Cholesterol (mg/dL)   Date Value   04/22/2017 120   , INR:   INR (no units)   Date Value   07/05/2022 2.4   , Mg:   Magnesium (mg/dL)   Date Value   07/05/2022 2.5 (H)    and Phosphorus: No results found for: PHOS   7/6 sodium 133, potassium 5.2, BUN 57, creatinine 1.60, WBC 7.7, hemoglobin 8.9, platelets 302, INR 2.59  7/11 sodium 133, potassium 5.0, BUN 21, creatinine 1.38, WBC 5.2, hemoglobin 9.1, INR 3.9    ASSESSMENT AND PLAN  Diagnoses and associated orders for this visit:  1. Debility  2.  Pain in both lower extremities  3. Chronic nonintractable headache, unspecified headache type  4. KRAIG (acute kidney injury) (CMS/[facility])  5. Type 2 diabetes mellitus with other diabetic kidney complication, without long-term current use of insulin (CMS/[facility])  6. Protein-calorie malnutrition, moderate (CMS/[facility])  7. Long term (current) use of anticoagulants  8. Acute deep vein thrombosis (DVT) of both lower extremities, unspecified vein (CMS/[facility])  9. Chronic diastolic congestive heart failure (CMS/[facility])  10. Atrial fibrillation, unspecified type (CMS/[facility])       #Extensive DVT to BLE - pain improved  #CHF, diastolic   -has old hx of dvt and PE  cont current meds -torsemide, diltiazem, lopressor  Elevate legs  cont norco to schedule q6hrs scheduled x2 days and re-evaluate on wed  Give additional torsemide 40mg po x2 days  Cont ACE WRAP to BLE  Coumadin for treatment of DVT and Afib      #headaches, chronic in nature, w/o aura - denies N/V - improved  Cont norco as above    #debility  PT/OT per rehab - making small gains  safety fall precautions  supportive care and education provided    #KRAIG - ATB -> labs improving  US in hospital showed no obstructions  avoid nephrotoxic drugs    #DMII -stable  Cont bgm daily  cont current meds - metformin  diabetic diet    #afib - rate controlled  #supratherapeutic INR  Cont amio  Hold coumadin x2 days  Repeat INR on wed    #PCM  Will consult palliative on discharge  Dietician to follow  Monitor    #hx of vtach  #hx of ICM  AICD in place    #depression -stable  Cont sertraline  Monitor    #MASD  Wound team following  Barrier cream    GI proph: add omeprazole  DVT proph: on coumadin  Code: DNR      FOLLOW UP APPOINTMENTS:  Future Appointments   Date Time Provider Department Center   10/13/2022  2:00 PM [facility](PM1) [id number] [id number]   12/8/2022  2:00 PM Nayeli Varghese NP [id number] [id number]   6/7/2023  2:30 PM Dean Hager MD [id number] [id number]        DISCHARGE PLANNING: ongoing, plans to return home w/ family    Prognosis: fair to poor    Discussed with: RN / Nursing, Patient, SW/, PT, OT, Speech Therapy, Respiratory Therapy and Reviewed old records    Barriers to discharge: therapy needs    Anticipated disposition: Home W/Palliative Care    Total time spent is more than 35 minutes, with more than 50% of the time spent in coordination of care, counseling, review of records and discussion of plan of care with the patient /staff /family.    Doreen Dutton, CNP

## 2022-08-22 NOTE — ED PROCEDURE NOTE - NUMBER OF WOUND CLOSURE STRIPS
3 [FreeTextEntry1] : 76 yo w \par CAD ( sp PCI 1/22) currently enrolled in cardiac rehab and doing well . Here for follow up.\par recently visited kids in [place]. Felt great except for some mild RIVERA when walking inclines. Some aches and pains as well which she attributes to age.\par \par Some minor bruising on DAPT but no serious bruising or bleeding.\par \par Reviewed current meds.\par \par Soem low BPs at rehab when she does take in much fluid. she is being more careful about po intake.

## 2023-07-01 NOTE — ED ADULT NURSE NOTE - CAS DISCH TRANSFER METHOD
Hemorrhoids    Hemorrhoids are swollen veins in and around the rectum or anus. There are two types of hemorrhoids:     Internal hemorrhoids. These occur in the veins that are just inside the rectum. They may poke through to the outside and become irritated and painful.  External hemorrhoids. These occur in the veins that are outside of the anus and can be felt as a painful swelling or hard lump near the anus.    Most hemorrhoids do not cause serious problems, and they can be managed with home treatments such as diet and lifestyle changes. If home treatments do not help your symptoms, procedures can be done to shrink or remove the hemorrhoids.    CAUSES  This condition is caused by increased pressure in the anal area. This pressure may result from various things, including:    Constipation.  Straining to have a bowel movement.  Diarrhea.  Pregnancy.  Obesity.  Sitting for long periods of time.  Heavy lifting or other activity that causes you to strain.  Anal sex.    SYMPTOMS  Symptoms of this condition include:    Pain.  Anal itching or irritation.  Rectal bleeding.  Leakage of stool (feces).  Anal swelling.  One or more lumps around the anus.    DIAGNOSIS  This condition can often be diagnosed through a visual exam. Other exams or tests may also be done, such as:    Examination of the rectal area with a gloved hand (digital rectal exam).  Examination of the anal canal using a small tube (anoscope).  A blood test, if you have lost a significant amount of blood.  A test to look inside the colon (sigmoidoscopy or colonoscopy).    TREATMENT  This condition can usually be treated at home. However, various procedures may be done if dietary changes, lifestyle changes, and other home treatments do not help your symptoms. These procedures can help make the hemorrhoids smaller or remove them completely. Some of these procedures involve surgery, and others do not. Common procedures include:    Rubber band ligation. Rubber bands are placed at the base of the hemorrhoids to cut off the blood supply to them.  Sclerotherapy. Medicine is injected into the hemorrhoids to shrink them.  Infrared coagulation. A type of light energy is used to get rid of the hemorrhoids.  Hemorrhoidectomy surgery. The hemorrhoids are surgically removed, and the veins that supply them are tied off.  Stapled hemorrhoidopexy surgery. A circular stapling device is used to remove the hemorrhoids and use staples to cut off the blood supply to them.    HOME CARE INSTRUCTIONS  Eating and Drinking    Eat foods that have a lot of fiber in them, such as whole grains, beans, nuts, fruits, and vegetables. Ask your health care provider about taking products that have added fiber (fiber supplements).  Drink enough fluid to keep your urine clear or pale yellow.    Managing Pain and Swelling    Take warm sitz baths for 20 minutes, 3–4 times a day to ease pain and discomfort.  If directed, apply ice to the affected area. Using ice packs between sitz baths may be helpful.  Put ice in a plastic bag.  Place a towel between your skin and the bag.  Leave the ice on for 20 minutes, 2–3 times a day.    General Instructions    Take over-the-counter and prescription medicines only as told by your health care provider.  Use medicated creams or suppositories as told.  Exercise regularly.  Go to the bathroom when you have the urge to have a bowel movement. Do not wait.  Avoid straining to have bowel movements.  Keep the anal area dry and clean. Use wet toilet paper or moist towelettes after a bowel movement.  Do not sit on the toilet for long periods of time. This increases blood pooling and pain.    SEEK MEDICAL CARE IF:  You have increasing pain and swelling that are not controlled by treatment or medicine.  You have uncontrolled bleeding.  You have difficulty having a bowel movement, or you are unable to have a bowel movement.  You have pain or inflammation outside the area of the hemorrhoids.    ADDITIONAL NOTES AND INSTRUCTIONS    Please follow up with your Primary MD in 24-48 hr.  Seek immediate medical care for any new/worsening signs or symptoms. Private car

## 2024-12-23 NOTE — ED ADULT TRIAGE NOTE - BP NONINVASIVE SYSTOLIC (MM HG)
Bonnieja Guerra is here today for Follow-up and Medicare Wellness Visit      Medications: medications verified, no change  Refills needed today? No     Tobacco history: verified    Advanced Directive:Yes on file.    BP >140/90? No    Care Teams Updated? Yes    Patient Preference for result Communication via:  [employer]    Cell Phone:   Telephone Information:   Mobile [phone]     Okay to leave a message containing results? Yes       Health Maintenance       Pneumococcal Vaccine 65+ (1 of 2 - PCV)  Never done    DTaP/Tdap/Td Vaccine (1 - Tdap)  Never done    Colorectal Cancer Screen (View Topic Details)  Ordered on 5/6/2024    COVID-19 Vaccine (3 - Pfizer risk series)  Overdue since 5/25/2021    Medicare Advantage- Medicare Wellness Visit (Yearly - January to December)  Overdue since 1/1/2024    Influenza Vaccine (1)  Never done    Microalbumin Ratio (Yearly)  Ordered on 11/12/2024    GFR (Yearly)  Ordered on 11/12/2024    Shingles Vaccine (1 of 2)  Postponed until 12/31/2024           Following review of the above:  Patient is not proceeding with: COVID-19, Dtap/Tdap/Td, Influenza, and Pneumococcal    Note: Refer to final orders and clinician documentation.          Immunization History   Administered Date(s) Administered    COVID Pfizer 12Y+ (Requires Dilution) 04/06/2021, 04/27/2021         PHQ 2:  PHQ 2 Score Adult PHQ 2 Score Adult PHQ 2 Interpretation Little interest or pleasure in activity?   12/17/2024   8:37 PM 0 No further screening needed 0       PHQ 9:     Immunization History   Administered Date(s) Administered    COVID Pfizer 12Y+ (Requires Dilution) 04/06/2021, 04/27/2021       Medicare HRA:  1.) Do you have an Advance directive, living will, or power of  for health care document that contains your wishes for end of life care?: I don't know     2.) Would you like additional information on advance directives?: Yes     6 b.) How many servings of High Fiber / Whole Grain Foods to you have  each day ( 1 serving = 1 cup cold cereal, 1/2 cup cooked cereal, 1 slice bread): 1 per day     6 c.) How many servings of Fried or High Fat Foods do you have each day (1 serving = 1 Ludwig, French Fries, chips, doughnut, fried chicken/fish): 2 per day     13.) Do you need help with any of the following activities?: Get to places outside of walking distance (can't drive alone, or take a bus/taxi alone), Go shopping for groceries or clothes         Hearing Exam Performed: yes / no: Yes    Vision Exam Performed: yes / no: No      PHQ 2:  PHQ 2 Score Adult PHQ 2 Score Adult PHQ 2 Interpretation Little interest or pleasure in activity?   12/17/2024   8:37 PM 0 No further screening needed 0       PHQ 9:          135
